# Patient Record
Sex: FEMALE | Race: OTHER | Employment: FULL TIME | URBAN - METROPOLITAN AREA
[De-identification: names, ages, dates, MRNs, and addresses within clinical notes are randomized per-mention and may not be internally consistent; named-entity substitution may affect disease eponyms.]

---

## 2017-05-30 ENCOUNTER — HOSPITAL ENCOUNTER (OUTPATIENT)
Dept: GENERAL RADIOLOGY | Age: 52
Discharge: HOME | End: 2017-05-30
Payer: COMMERCIAL

## 2017-05-30 DIAGNOSIS — M54.16 LUMBAR RADICULOPATHY: ICD-10-CM

## 2017-05-30 PROCEDURE — 72110 X-RAY EXAM L-2 SPINE 4/>VWS: CPT

## 2017-07-06 ENCOUNTER — OFFICE VISIT (OUTPATIENT)
Dept: ORTHOPEDIC SURGERY | Age: 52
End: 2017-07-06

## 2017-07-06 VITALS
OXYGEN SATURATION: 99 % | TEMPERATURE: 98.5 F | SYSTOLIC BLOOD PRESSURE: 133 MMHG | BODY MASS INDEX: 51.39 KG/M2 | HEIGHT: 58 IN | WEIGHT: 244.8 LBS | RESPIRATION RATE: 18 BRPM | HEART RATE: 82 BPM | DIASTOLIC BLOOD PRESSURE: 77 MMHG

## 2017-07-06 DIAGNOSIS — M47.816 SPONDYLOSIS OF LUMBAR REGION WITHOUT MYELOPATHY OR RADICULOPATHY: Primary | ICD-10-CM

## 2017-07-06 RX ORDER — GLIMEPIRIDE 4 MG/1
TABLET ORAL
Refills: 0 | COMMUNITY
Start: 2017-06-19 | End: 2018-11-01

## 2017-07-06 RX ORDER — NAPROXEN 500 MG/1
TABLET ORAL
Refills: 0 | COMMUNITY
Start: 2017-06-30 | End: 2018-11-01

## 2017-07-06 RX ORDER — HYDROCODONE BITARTRATE AND ACETAMINOPHEN 7.5; 3 MG/1; MG/1
TABLET ORAL
Refills: 0 | COMMUNITY
Start: 2017-06-28 | End: 2018-11-01

## 2017-07-06 RX ORDER — GABAPENTIN 300 MG/1
300 CAPSULE ORAL
Qty: 30 CAP | Refills: 1 | Status: SHIPPED | OUTPATIENT
Start: 2017-07-06 | End: 2018-11-01

## 2017-07-06 RX ORDER — CYCLOBENZAPRINE HCL 5 MG
TABLET ORAL
Refills: 0 | COMMUNITY
Start: 2017-06-28 | End: 2018-11-01

## 2017-07-06 NOTE — PROGRESS NOTES
Verbal order entered per Dr. Dereck Gray as documented on blue sheet:Gabapentin 300mg take 1 tab PO QHS. Disp 30 with 1 refill.  Referral to PT left L4/L5 disc protrusion

## 2017-07-06 NOTE — PATIENT INSTRUCTIONS
Learning About Degenerative Disc Disease  What is degenerative disc disease? Degenerative disc disease is not really a disease. It's a term used to describe the normal changes in your spinal discs as you age. Spinal discs are small, spongy discs that separate the bones (vertebrae) that make up the spine. The discs act as shock absorbers for the spine, so it can flex, bend, and twist.  Degenerative disc disease can take place in one or more places along the spine. It most often occurs in the discs in the lower back and the neck. What causes it? As we age, our spinal discs break down, or degenerate. This breakdown causes the symptoms of degenerative disc disease in some people. When the discs break down, they can lose fluid and dry out, and their outer layers can have tiny cracks or tears. This leads to less padding and less space between the bones in the spine. The body reacts to this by making bony growths on the spine called bone spurs. These spurs can press on the spinal nerve roots or spinal cord. This can cause pain and can affect how well the nerves work. What are the symptoms? Many people with degenerative disc disease have no pain. But others have severe pain or other symptoms that limit their activities. Some of the most common symptoms are:  · Pain in the back or neck. Where the pain occurs depends on which discs are affected. · Pain that gets worse when you move, such as bending over, reaching up, or twisting. · Pain that may occur in the rear end (buttocks), arm, or leg if a nerve is pinched. · Numbness or tingling in your arm or leg. How is it diagnosed? A doctor can often diagnose degenerative disc disease while doing a physical exam. If your exam shows no signs of a serious condition, imaging tests (such as an X-ray) aren't likely to help your doctor find the cause of your symptoms.   Sometimes degenerative disc disease is found when an X-ray is taken for another reason, such as an injury or other health problem. But even if the doctor finds degenerative disc disease, that doesn't always mean that you will have symptoms. How is it treated? There are several things you can do at home to manage pain from this problem. · To relieve pain, use ice or heat (whichever feels better) on the affected area. ¨ Put ice or a cold pack on the area for 10 to 20 minutes at a time. Put a thin cloth between the ice and your skin. ¨ Put a warm water bottle, a heating pad set on low, or a warm cloth on your back. Put a thin cloth between the heating pad and your skin. Do not go to sleep with a heating pad on your skin. · Ask your doctor if you can take acetaminophen (such as Tylenol) or nonsteroidal anti-inflammatory drugs, such as ibuprofen or naproxen. Your doctor can prescribe stronger medicines if needed. Be safe with medicines. Read and follow all instructions on the label. · Get some exercise every day. Exercise is one of the best ways to help your back feel better and stay better. It's best to start each exercise slowly. You may notice a little soreness, and that's okay. But if an exercise makes your pain worse, stop doing it. Here are things you can try:  ¨ Walking. It's the simplest and maybe the best activity for your back. It gets your blood moving and helps your muscles stay strong. ¨ Exercises that gently stretch and strengthen your stomach, back, and leg muscles. The stronger those muscles are, the better they're able to protect your back. If you have constant or severe pain in your back or spine, you may need other treatments, such as physical therapy. In some cases, your doctor may suggest surgery. Follow-up care is a key part of your treatment and safety. Be sure to make and go to all appointments, and call your doctor if you are having problems. It's also a good idea to know your test results and keep a list of the medicines you take. Where can you learn more?   Go to http://tammie.info/. Enter P062 in the search box to learn more about \"Learning About Degenerative Disc Disease. \"  Current as of: March 21, 2017  Content Version: 11.3  © 9512-1208 [employer]. Care instructions adapted under license by [employer] (which disclaims liability or warranty for this information). If you have questions about a medical condition or this instruction, always ask your healthcare professional. [place] [address] any warranty or liability for your use of this information.

## 2017-07-06 NOTE — PROGRESS NOTES
[address]  [address], [address]  [place] [address]  Phone: [phone]  Fax: [phone]        Gregg Lugo  : 1965  PCP: Evaristo Castillo MD      NEW PATIENT      ASSESSMENT AND PLAN     Toan Cannon was seen today for back pain. Diagnoses and all orders for this visit:    Spondylosis of lumbar region without myelopathy or radiculopathy  -     REFERRAL TO PHYSICAL THERAPY    Other orders  -     gabapentin (NEURONTIN) 300 mg capsule; Take 1 Cap by mouth nightly. 1. No indications for surgery at this time. 2. Referral to physical therapy. 3. Trial of Gabapentin. 4. Given information on lumbar DDD. Follow-up Disposition:  Return in about 6 weeks (around 2017). CHIEF COMPLAINT  Gil Guerrero is seen today in consultation at the request of Dr. Karlos Zapata for complaints of low back pain x 3 months. HISTORY OF PRESENT ILLNESS  Gil Guerrero is a 46 y.o. female. Pt denies any specific incident or injury that caused their pain. She notes that she has had back pain before that was relieved with a massage. This pain she has now has not gone away with massage. Her pain radiates into her LLE into her foot, intermittently. She also c/o paresthesia in her LLE. She reports that her shooting pain was so severe that it would bring her to her knees. Pt denies any paresthesia in her RT foot. She admits to a poor balance. She has not tried physical therapy for her pain. Her pain is mainly felt in her back. Pt notes that since starting Flexeril 5 mg QAM and 10 mg QHS, she has been sleeping better. Pt notes that she is doing better with her current medication regimen. She reports that her walking tolerance is normal but her pain will be increased by the time she gets home. Before she started medication, she was unable to walk around the classroom as this would cause her pain to become exacerbated.  She denies any noticed weakness in her BLE. No saddle paresthesia. Pt takes Naprosyn 500 mg with benefit. Denies persistent fevers, chills, weight changes, neurogenic bowel or bladder symptoms. Pt denies recent ED visits or hospitalizations. Pt is a . Lumbar spine MRI from MRI CT DIAGNOSTIC reviewed:   IMPRESSION  Posterior radial annular fissures at T12-L1, L3-4 and L4-5  Posterior disc extrusion at T8-9, this is at the limited exam and may touch the ventral cord. L4-5: Central disc protrusion. Advanced bilateral facet arthropathy. No evidence of neural compression. L5-S1: Advanced bilateral facet arthropathy. Pain Assessment  2017   Location of Pain Back   Severity of Pain 4   Quality of Pain Aching   Quality of Pain Comment numbness, tingling   Frequency of Pain Constant   Aggravating Factors Walking;Standing;Stairs; Bending   Aggravating Factors Comment sitting   Relieving Factors (No Data)   Relieving Factors Comment pain med         XR Results (most recent):    Results from Hospital Encounter encounter on 17   XR SPINE LUMB MIN 4 V   Narrative Lumbar Spine Series    CPT CODE: 49064    HISTORY: Low back pain x2 months, pain. COMPARISON: None. FINDINGS:    Five views obtained. The vertebra are normal in height and alignment. The disc  spaces are mildly narrowed at L3/4 and L4/5. Ventral spurring seen arising off  all the lumbar vertebral bodies. . The pedicles and pars are intact. There is no  evidence of fracture or dislocation. Mineralization is normal. There is marked  increase volume of stool throughout the colon. Impression IMPRESSION:    Mild degenerative disc disease of the lumbar spine. Marked increase volume of stool throughout the colon.                  PAST MEDICAL HISTORY   Past Medical History:   Diagnosis Date    Diabetes mellitus ([address]     Hypertension        Past Surgical History:   Procedure Laterality Date    HX  SECTION         MEDICATIONS Current Outpatient Prescriptions   Medication Sig Dispense Refill    HYDROcodone-acetaminophen (XODOL) 7.5-300 mg tablet take 1/2 to 1 tablet by mouth every 4 hours if needed for pain  0    naproxen (NAPROSYN) 500 mg tablet   0    glimepiride (AMARYL) 4 mg tablet take 1 tablet by mouth once daily  0    cyclobenzaprine (FLEXERIL) 5 mg tablet TAKE 1 TO 2 TABLETS BY MOUTH 3 TIMES A DAY IF NEEDED FOR SPASM  0    pravastatin (PRAVACHOL) 40 mg tablet Take 40 mg by mouth nightly.  pioglitazone (ACTOS) 15 mg tablet Take 45 mg by mouth daily.  lisinopril (PRINIVIL, ZESTRIL) 5 mg tablet Take 5 mg by mouth daily.  lansoprazole (PREVACID) 30 mg capsule Take 30 mg by mouth Daily (before breakfast).  metoprolol-XL (TOPROL-XL) 100 mg XL tablet Take 100 mg by mouth daily.  triamterene-hydrochlorothiazide (MAXZIDE) 75-50 mg per tablet Take 0.5 Tabs by mouth daily.  aspirin delayed-release 81 mg tablet Take 1 Tab by mouth daily. 30 Tab 0    nitroglycerin (NITROSTAT) 0.4 mg SL tablet 1 Tab by SubLINGual route every five (5) minutes as needed for Chest Pain. 10 Tab 0       ALLERGIES  No Known Allergies       SOCIAL HISTORY    Social History     Social History    Marital status:      Spouse name: N/A    Number of children: N/A    Years of education: N/A     Occupational History    Not on file. Social History Main Topics    Smoking status: Never Smoker    Smokeless tobacco: Never Used    Alcohol use No    Drug use: No    Sexual activity: Not on file     Other Topics Concern    Not on file     Social History Narrative       FAMILY HISTORY  Family History   Problem Relation Age of Onset    Hypertension Mother     Diabetes Father     Hypertension Father     Diabetes Paternal Grandmother     Heart Disease Paternal Grandfather          REVIEW OF SYSTEMS  Review of Systems   Constitutional: Negative for chills, fever and weight loss.    Respiratory: Negative for shortness of breath. Cardiovascular: Negative for chest pain. Gastrointestinal: Negative for constipation. Negative for fecal incontinence   Genitourinary: Negative for dysuria. Negative for urinary incontinence   Musculoskeletal:        Per HPI   Skin: Negative for rash. Neurological: Positive for tingling. Negative for dizziness, tremors, focal weakness and headaches. Endo/Heme/Allergies: Does not bruise/bleed easily. Psychiatric/Behavioral: The patient does not have insomnia. PHYSICAL EXAMINATION  Visit Vitals    /77    Pulse 82    Temp 98.5 °F (36.9 °C) (Oral)    Resp 18    Ht 4' 10\" (1.473 m)    Wt 244 lb 12.8 oz (111 kg)    SpO2 99%    BMI 51.16 kg/m2          Accompanied by self. Constitutional:  Well developed, well nourished, in no acute distress. Psychiatric: Affect and mood are appropriate. Integumentary: No rashes or abrasions noted on exposed areas. Cardiovascular/Peripheral Vascular: Intact l pulses. No peripheral edema is noted. Lymphatic:  No evidence of lymphedema. No cervical lymphadenopathy. SPINE/MUSCULOSKELETAL EXAM    Lumbar spine:  No rash, ecchymosis, or gross obliquity. No fasciculations. No focal atrophy is noted. No pain with hip ROM. Tenderness to palpation LT at L5-S1. No tenderness to palpation at the sciatic notch. SI joints non-tender. Trochanters non tender. Sensation grossly intact to light touch. MOTOR:       Hip Flex  Quads Hamstrings Ankle DF EHL Ankle PF   Right +4/5 +4/5 +4/5 +4/5 +4/5 +4/5   Left +4/5 +4/5 +4/5 +4/5 4/5 +4/5     Straight Leg raise negative. Mild difficulty with tandem gait. Heel walk intact. Toe rise intact    Ambulation without assistive device. FWB. Written by Adriane Calvillo, as dictated by Jessica Marte MD.    I, Dr. Jessica Marte MD, confirm that all documentation is accurate. Ms. Viviane Holland may have a reminder for a \"due or due soon\" health maintenance.  I have asked that she contact her primary care provider for follow-up on this health maintenance.

## 2017-07-11 ENCOUNTER — HOSPITAL ENCOUNTER (OUTPATIENT)
Dept: PHYSICAL THERAPY | Age: 52
Discharge: HOME | End: 2017-07-11
Payer: COMMERCIAL

## 2017-07-11 PROCEDURE — 97110 THERAPEUTIC EXERCISES: CPT

## 2017-07-11 PROCEDURE — 97162 PT EVAL MOD COMPLEX 30 MIN: CPT

## 2017-07-11 PROCEDURE — 97140 MANUAL THERAPY 1/> REGIONS: CPT

## 2017-07-11 NOTE — PROGRESS NOTES
[facility]  [address] [facility]  [place], [address]  [phone] fax    Plan of Care/ Statement of Necessity for Physical Therapy Services    Patient name: Ashwin Torres Start of Care: 2017   Referral source: Marisela Palm MD : 1965    Medical Diagnosis: Spondylosis without myelopathy or radiculopathy, lumbar region [M47.816]   Onset Date: May 2017    Treatment Diagnosis: L L/S pain with LE radiculopathy   Prior Hospitalization: see medical history Provider#: [id number]   Medications: Verified on Patient summary List    Comorbidities: morbid obesity; DM; HTN   Prior Level of Function: Able to tolerate prolonged sitting/standing; works as an      The [address] [place] [address] and following information is based on the information from the initial evaluation. Assessment/ key information: 46y.o. year old female presents with CC of L sided L/S with L LE radiculopathy that began in May 2017. Deficits include:  Decreased PA mobility at L4L5; increased mm in superior L glute and piriformis; decreased ant hip mobility and hip flexor flexibility ; poor glute and TA recruitment and strength/stabiltiy. Patient will benefit from physical therapy to address deficits, and ultimately to return patient to prior level of function. Evaluation Complexity History MEDIUM  Complexity : 1-2 comorbidities / personal factors will impact the outcome/ POC ; Examination MEDIUM Complexity : 3 Standardized tests and measures addressing body structure, function, activity limitation and / or participation in recreation  ;Presentation MEDIUM Complexity : Evolving with changing characteristics  ; Clinical Decision Making MEDIUM Complexity : FOTO score of 26-74  Overall Complexity Rating: MEDIUM  Problem List: pain affecting function, decrease ROM, decrease strength, decrease ADL/ functional abilitiies, decrease activity tolerance and decrease flexibility/ joint mobility   Treatment Plan may include any combination of the following: Therapeutic exercise, Neuromuscular re-education, Physical agent/modality, Manual therapy and Patient education  Patient / Family readiness to learn indicated by: asking questions, trying to perform skills and interest  Persons(s) to be included in education: patient (P)  Barriers to Learning/Limitations: None  Patient Goal (s): to decrease pain and stop taking medicine  Patient Self Reported Health Status: good  Rehabilitation Potential: good    Short Term Goals: To be accomplished in 1-2 weeks:   1. I and compliant with HEP for self management of symptoms. Long Term Goals: To be accomplished in 4 weeks:   1. Improve FOTO to 70 to indicate improved function with daily activities. 2. Increase B glute strength to grossly 4+5 to improve stability for prolonged standing. 3. Decrease L glute mm restrictions by >50% to decrease radicular symptoms for community ambulation. Frequency / Duration: Patient to be seen 2 times per week for 4 weeks. Patient/ Caregiver education and instruction: Diagnosis, prognosis, exercises   [x]  Plan of care has been reviewed with VILMA Encinas, PT 7/11/2017 10:02 AM    ________________________________________________________________________    I certify that the above Therapy Services are being furnished while the patient is under my care. I agree with the treatment plan and certify that this therapy is necessary.     [de-identified] Signature:____________________  Date:____________Time: _________    Please sign and return to [facility]  [address] [facility] [address]   [place], [address]  [phone] fax

## 2017-07-11 NOTE — PROGRESS NOTES
PT DAILY TREATMENT NOTE     Patient Name: Nora Reed  Date:2017  : 1965  [x]  Patient  Verified  Payor: Hadley Epley / Plan: [facility] / Product Type: HMO /    In time:840  Out time:923  Total Treatment Time (min): 43  Visit #: 1 of 8    Treatment Area: Spondylosis without myelopathy or radiculopathy, lumbar region [M47.816]    SUBJECTIVE  Pain Level (0-10 scale): 2  Any medication changes, allergies to medications, adverse drug reactions, diagnosis change, or new procedure performed?: [x] No    [] Yes (see summary sheet for update)  Subjective functional status/changes:   [] No changes reported  See eval    OBJECTIVE      12 min [x]Eval                  []Re-Eval       23 min Therapeutic Exercise:  [] See flow sheet :   Rationale: increase ROM and increase strength to improve the patients ability to perform daily activities    8 min Manual Therapy:  Per flow sheet   Rationale: decrease pain, increase ROM, increase tissue extensibility and decrease trigger points to decrease radicular symptoms and improve end-range ext for standing          With   [] TE   [] TA   [] neuro   [] other: Patient Education: [x] Review HEP    [] Progressed/Changed HEP based on:   [] positioning   [] body mechanics   [] transfers   [] heat/ice application    [] other:      Other Objective/Functional Measures:      Pain Level (0-10 scale) post treatment: 2    ASSESSMENT/Changes in Function: see POC    Patient will continue to benefit from skilled PT services to modify and progress therapeutic interventions, address functional mobility deficits, address ROM deficits, address strength deficits, analyze and address soft tissue restrictions, analyze and cue movement patterns and assess and modify postural abnormalities to attain remaining goals. [x]  See Plan of Care  []  See progress note/recertification  []  See Discharge Summary         Progress towards goals / Updated goals:  Short Term Goals:  To be accomplished in 1-2 weeks:                         1. I and compliant with HEP for self management of symptoms. Long Term Goals: To be accomplished in 4 weeks:                         1. Improve FOTO to 70 to indicate improved function with daily activities. 2. Increase B glute strength to grossly 4+5 to improve stability for prolonged standing. 3. Decrease L glute mm restrictions by >50% to decrease radicular symptoms for community ambulation.      PLAN  []  Upgrade activities as tolerated     [x]  Continue plan of care  []  Update interventions per flow sheet       []  Discharge due to:_  []  Other:_      Adarsh Roper, PT 7/11/2017  10:31 AM    Future Appointments  Date Time Provider Erin Rodrigues   7/14/2017 9:00 AM Simone Fairchild, PT [facility]   7/18/2017 7:00 AM Jairo Barragan, PT [facility]   7/21/2017 10:30 AM Lizbeth Mckeon, PT [facility]   7/25/2017 9:30 AM Jairo Barragan, PT [facility]   7/27/2017 10:00 AM Adarsh Roper, PT [facility]   7/31/2017 8:30 AM Jairo Barragan, PT [facility]   8/4/2017 9:00 AM Adarsh Roper, PT [facility]

## 2017-07-14 ENCOUNTER — HOSPITAL ENCOUNTER (OUTPATIENT)
Dept: PHYSICAL THERAPY | Age: 52
Discharge: HOME | End: 2017-07-14
Payer: COMMERCIAL

## 2017-07-14 PROCEDURE — 97110 THERAPEUTIC EXERCISES: CPT | Performed by: PHYSICAL THERAPIST

## 2017-07-14 PROCEDURE — 97140 MANUAL THERAPY 1/> REGIONS: CPT | Performed by: PHYSICAL THERAPIST

## 2017-07-14 NOTE — PROGRESS NOTES
PT DAILY TREATMENT NOTE     Patient Name: Sg Restrepo  Date:2017  : 1965  [x]  Patient  Verified  Payor: Johanna Almeida / Plan: Kenia Potter / Product Type: HMO /    In time:9:00  Out time:9:34  Total Treatment Time (min): 34  Visit #: 2 of 8    Treatment Area: Spondylosis without myelopathy or radiculopathy, lumbar region [M47.816]    SUBJECTIVE  Pain Level (0-10 scale): 3  Any medication changes, allergies to medications, adverse drug reactions, diagnosis change, or new procedure performed?: [x] No    [] Yes (see summary sheet for update)  Subjective functional status/changes:   [] No changes reported  Feels good. No new issues. Just has pain in the back. OBJECTIVE    24 min Therapeutic Exercise:  [x] See flow sheet :   Rationale: increase ROM, increase strength and decrease pain to improve the patients ability to complete ADLs    10 min Manual Therapy:   PA mobs, trigger point release   Rationale: decrease pain, increase ROM, increase tissue extensibility and decrease trigger points to aid completion of ADLs            With   [x] TE   [] TA   [] neuro   [] other: Patient Education: [x] Review HEP    [] Progressed/Changed HEP based on:   [] positioning   [] body mechanics   [] transfers   [] heat/ice application    [] other:      Other Objective/Functional Measures: NA     Pain Level (0-10 scale) post treatment: 3    ASSESSMENT/Changes in Function: Patient responds well to treatment session. Has minimal difficulty with exercises as prescribed. Remains TTP in L QL/glute med resion. Progress as tolerated. No adverse effects were noted from today's treatment session      Patient will continue to benefit from skilled PT services to modify and progress therapeutic interventions, address functional mobility deficits, address ROM deficits and address strength deficits to attain remaining goals.      [x]  See Plan of Care  []  See progress note/recertification  []  See Discharge Summary Progress towards goals / Updated goals:  Short Term Goals: To be accomplished in 1-2 weeks:                         1. I and compliant with HEP for self management of symptoms. Met 7/14/2017  Long Term Goals: To be accomplished in 4 weeks:                         1. Improve FOTO to 70 to indicate improved function with daily activities. 2. Increase B glute strength to grossly 4+5 to improve stability for prolonged standing. 3. Decrease L glute mm restrictions by >50% to decrease radicular symptoms for community ambulation.      PLAN  [x]  Upgrade activities as tolerated     [x]  Continue plan of care  []  Update interventions per flow sheet       []  Discharge due to:_  []  Other:_      Nadia Andrews, WARREN 7/14/2017  9:40 AM    Future Appointments  Date Time Provider Erin Rodrigues   7/18/2017 7:00 AM Junior Kee, PT [facility]   7/21/2017 10:30 AM Nati Yousif, PT [facility]   7/25/2017 9:30 AM Junior Kee, PT [facility]   7/27/2017 10:00 AM Boo Nixon, PT [facility]   7/31/2017 8:30 AM Junior Kee, PT [facility]   8/4/2017 9:00 AM Boo Ring, PT [facility]

## 2017-07-18 ENCOUNTER — HOSPITAL ENCOUNTER (OUTPATIENT)
Dept: PHYSICAL THERAPY | Age: 52
Discharge: HOME | End: 2017-07-18
Payer: COMMERCIAL

## 2017-07-18 PROCEDURE — 97112 NEUROMUSCULAR REEDUCATION: CPT

## 2017-07-18 PROCEDURE — 97110 THERAPEUTIC EXERCISES: CPT

## 2017-07-18 NOTE — PROGRESS NOTES
PT DAILY TREATMENT NOTE     Patient Name: Darby Herring  Date:2017  : 1965  [x]  Patient  Verified  Payor: Elois Schlatter / Plan: Rosa Mask / Product Type: HMO /    In time:7:00  Out time:7:43  Total Treatment Time (min): 43  Visit #: 3 of 8    Treatment Area: Spondylosis without myelopathy or radiculopathy, lumbar region [M47.816]    SUBJECTIVE  Pain Level (0-10 scale): 10  Any medication changes, allergies to medications, adverse drug reactions, diagnosis change, or new procedure performed?: [x] No    [] Yes (see summary sheet for update)  Subjective functional status/changes:   [] No changes reported  The patient states that she has most of her pain in the morning. This morning she rates as a not too bad, and notes that she has been compliant with home exercises. She does note that following last appointment she was sore in her left hip for about 2 days following manual.      OBJECTIVE  31 min Therapeutic Exercise:  [x] See flow sheet :   Rationale: increase ROM and increase strength to improve the patients ability to improve ADL ease. 12 min Neuromuscular Re-education:  [x]  See flow sheet :   Rationale: increase ROM and increase strength  to improve the patients ability to improve ADL ease. With   [] TE   [] TA   [] neuro   [] other: Patient Education: [x] Review HEP    [] Progressed/Changed HEP based on:   [] positioning   [] body mechanics   [] transfers   [] heat/ice application    [] other:      Other Objective/Functional Measures:   Required cuing to reduce lateral trunk lean during standing marches with TB isometric shoulder extension as well as hip x 3. Pain Level (0-10 scale) post treatment:  2/10    ASSESSMENT/Changes in Function: Added closed chain exercises and progressed table exercises void of increase of pain. Opted to hold manual this visit and progress exercise and ascertain response without added variable of both entities.      Patient will continue to benefit from skilled PT services to modify and progress therapeutic interventions, address functional mobility deficits, address ROM deficits, address strength deficits, analyze and address soft tissue restrictions, analyze and cue movement patterns, analyze and modify body mechanics/ergonomics, assess and modify postural abnormalities and instruct in home and community integration to attain remaining goals. []  See Plan of Care  []  See progress note/recertification  []  See Discharge Summary         Progress towards goals / Updated goals:  Short Term Goals: To be accomplished in 1-2 weeks:                         1. I and compliant with HEP for self management of symptoms. Met 7/14/2017  Long Term Goals: To be accomplished in 4 weeks:                         1. Improve FOTO to 70 to indicate improved function with daily activities. 2. Increase B glute strength to grossly 4+5 to improve stability for prolonged standing. 3. Decrease L glute mm restrictions by >50% to decrease radicular symptoms for community ambulation.      PLAN  []  Upgrade activities as tolerated     [x]  Continue plan of care  []  Update interventions per flow sheet       []  Discharge due to:_  []  Other:_      Desmond Funez, PT 7/18/2017  7:06 AM    Future Appointments  Date Time Provider Erin Rodrigues   7[facility]21[facility]2017 10:30 AM [facility]   7/25/2017 9:30 AM Desmond Funez, PT [facility]   7/27/2017 10:00 AM [facility]   7/31/2017 8:30 AM Desmond Funez PT [facility]   8/4/2017 9:00 AM Chirag Flood, PT [facility]

## 2017-07-20 DIAGNOSIS — M54.16 LUMBAR RADICULOPATHY: ICD-10-CM

## 2017-07-21 ENCOUNTER — HOSPITAL ENCOUNTER (OUTPATIENT)
Dept: PHYSICAL THERAPY | Age: 52
Discharge: HOME | End: 2017-07-21
Payer: COMMERCIAL

## 2017-07-21 PROCEDURE — 97140 MANUAL THERAPY 1/> REGIONS: CPT

## 2017-07-21 PROCEDURE — 97110 THERAPEUTIC EXERCISES: CPT

## 2017-07-21 PROCEDURE — 97112 NEUROMUSCULAR REEDUCATION: CPT

## 2017-07-21 NOTE — PROGRESS NOTES
PT DAILY TREATMENT NOTE - [employer] [facility] 3-16    Patient Name: Hernandez Banks  Date:2017  : 1965  [x]  Patient  Verified  Payor: Jaspreet Ruiz / Plan: Ayden Connor / Product Type: HMO /    In time:1033  Out time:1102  Total Treatment Time (min): 29  Total Timed Codes (min): 29  1:1 Treatment Time ( [address] only): 25   Visit #: 4 of 8    Treatment Area: Spondylosis without myelopathy or radiculopathy, lumbar region [M47.816]    SUBJECTIVE  Pain Level (0-10 scale): 8  Any medication changes, allergies to medications, adverse drug reactions, diagnosis change, or new procedure performed?: [x] No    [] Yes (see summary sheet for update)  Subjective functional status/changes:   [] No changes reported  Pt reports increased pain today, waking up with more pain and worse when sitting in the car.     OBJECTIVE     min []Eval                  []Re-Eval     10 min Therapeutic Exercise:  [x] See flow sheet :   Rationale: increase ROM and increase strength to improve the patients ability to perform ADLs    11 min Neuromuscular Re-education:  [x]  See flow sheet :   Rationale: increase strength, improve coordination and increase proprioception  to improve the patients ability to improve stability     8 min Manual Therapy:  R ant/L post SIJ MET with shotgun, L sacral torsion MET, end range million dollar roll position stretch, MFR along L glutes with hip IR/ER   Rationale: decrease pain, increase ROM and increase tissue extensibility to perform ADLs            With   [] TE   [] TA   [] neuro   [] other: Patient Education: [x] Review HEP    [] Progressed/Changed HEP based on:   [] positioning   [] body mechanics   [] transfers   [] heat/ice application    [] other:      Other Objective/Functional Measures:   Radicular symptoms - worse pre-manual; eased/abolished post manual     Pain Level (0-10 scale) post treatment: 3    ASSESSMENT/Changes in Function:   Pt demonstrated a SIJ mal-alignment issue today, probably leading to increased pain and TTP reported along L SIJ region. Decreased tenderness and pain post manual, and reinforced stability with flexibility and standing hip strengthening. Pt asked about her normal massage, and she was encouraged to continue with this. Reviewed MHP/CP use for pain control. Patient will continue to benefit from skilled PT services to modify and progress therapeutic interventions, address functional mobility deficits, address ROM deficits, address strength deficits, analyze and address soft tissue restrictions, analyze and cue movement patterns, analyze and modify body mechanics/ergonomics, assess and modify postural abnormalities and instruct in home and community integration to attain remaining goals. []  See Plan of Care  []  See progress note/recertification  []  See Discharge Summary         Progress towards goals / Updated goals:  Short Term Goals: To be accomplished in 1-2 weeks:                         1. I and compliant with HEP for self management of symptoms. Met 7/14/2017  Long Term Goals: To be accomplished in 4 weeks:                         1. Improve FOTO to 70 to indicate improved function with daily activities. 2. Increase B glute strength to grossly 4+5 to improve stability for prolonged standing. 3. Decrease L glute mm restrictions by >50% to decrease radicular symptoms for community ambulation.  not met L>R 7/21/2017     PLAN  [x]  Upgrade activities as tolerated     [x]  Continue plan of care  [x]  Update interventions per flow sheet       []  Discharge due to:_  []  Other:_      Mecca Downs, PT 7/21/2017  10:16 AM    Future Appointments  Date Time Provider Erin Rodrigues   7/21/2017 10:30 AM [facility]   7/25/2017 9:30 AM [facility]   7/27/2017 10:00 AM [facility]   7/31/2017 8:30 AM Zoey Malone PT [facility]   8/4/2017 9:00 AM Dondra Hang, PT [facility]

## 2017-07-25 ENCOUNTER — HOSPITAL ENCOUNTER (OUTPATIENT)
Dept: PHYSICAL THERAPY | Age: 52
Discharge: HOME | End: 2017-07-25
Payer: COMMERCIAL

## 2017-07-25 PROCEDURE — 97110 THERAPEUTIC EXERCISES: CPT

## 2017-07-25 PROCEDURE — 97112 NEUROMUSCULAR REEDUCATION: CPT

## 2017-07-25 PROCEDURE — 97140 MANUAL THERAPY 1/> REGIONS: CPT

## 2017-07-25 NOTE — PROGRESS NOTES
PT DAILY TREATMENT NOTE     Patient Name: Joseph Romero  Date:2017  : 1965  [x]  Patient  Verified  Payor: Annette Helms / Plan: Levon Back / Product Type: HMO /    In time:9:30  Out time:10:20  Total Treatment Time (min): 50  Visit #: 5 of 8    Treatment Area: Spondylosis without myelopathy or radiculopathy, lumbar region [M47.816]    SUBJECTIVE  Pain Level (0-10 scale): 210  Any medication changes, allergies to medications, adverse drug reactions, diagnosis change, or new procedure performed?: [x] No    [] Yes (see summary sheet for update)  Subjective functional status/changes:   [] No changes reported  The patient states she had a pretty good weekend. OBJECTIVE  32 min Therapeutic Exercise:  [x] See flow sheet :   Rationale: increase ROM and increase strength to improve the patients ability to improve ADL ease. 10 min Neuromuscular Re-education:  [x]  See flow sheet :   Rationale: increase ROM and increase strength  to improve the patients ability to improve ADL ease. 8 min Manual Therapy:  L sacral torsion, with nutation noted. MET performed, near WNL following    Rationale: decrease pain, increase ROM and increase tissue extensibility to improve ADL ease         With   [] TE   [] TA   [] neuro   [] other: Patient Education: [x] Review HEP    [] Progressed/Changed HEP based on:   [] positioning   [] body mechanics   [] transfers   [] heat/ice application    [] other:      Other Objective/Functional Measures: FOTO: 56   Hip ABD: 4+/5 MMT bilaterally  Hip extension: 4+/5 MMT bilaterally    Pain Level (0-10 scale) post treatment: 2/10    ASSESSMENT/Changes in Function: The patient has progressed with FOTO score and hip strength. She has progressed fairly well PT up to this point.     Patient will continue to benefit from skilled PT services to modify and progress therapeutic interventions, address functional mobility deficits, address ROM deficits, address strength deficits, analyze and address soft tissue restrictions, analyze and cue movement patterns, analyze and modify body mechanics/ergonomics, assess and modify postural abnormalities and instruct in home and community integration to attain remaining goals. []  See Plan of Care  []  See progress note/recertification  []  See Discharge Summary         Progress towards goals / Updated goals:  Short Term Goals: To be accomplished in 1-2 weeks:                         1. I and compliant with HEP for self management of symptoms. Met 7/14/2017  Long Term Goals: To be accomplished in 4 weeks:                         1. Improve FOTO to 70 to indicate improved function with daily activities. Progressed to 56 7/25/2017  2. Increase B glute strength to grossly 4+5 to improve stability for prolonged standing. Met - 4+/5 MMT   3. Decrease L glute mm restrictions by >50% to decrease radicular symptoms for community ambulation.  not met L>R 7/21/2017     PLAN  []  Upgrade activities as tolerated     [x]  Continue plan of care  []  Update interventions per flow sheet       []  Discharge due to:_  []  Other:_      Jairo Barragan, PT 7/25/2017  9:58 AM    Future Appointments  Date Time Provider Erin Rodrigues   8/4/2017 9:00 AM Adarsh Roper, PT [facility]

## 2017-07-27 ENCOUNTER — APPOINTMENT (OUTPATIENT)
Dept: PHYSICAL THERAPY | Age: 52
End: 2017-07-27
Payer: COMMERCIAL

## 2017-07-31 ENCOUNTER — APPOINTMENT (OUTPATIENT)
Dept: PHYSICAL THERAPY | Age: 52
End: 2017-07-31
Payer: COMMERCIAL

## 2017-08-04 ENCOUNTER — HOSPITAL ENCOUNTER (OUTPATIENT)
Dept: PHYSICAL THERAPY | Age: 52
Discharge: HOME | End: 2017-08-04
Payer: COMMERCIAL

## 2017-08-04 PROCEDURE — 97112 NEUROMUSCULAR REEDUCATION: CPT

## 2017-08-04 PROCEDURE — 97140 MANUAL THERAPY 1/> REGIONS: CPT

## 2017-08-04 PROCEDURE — 97110 THERAPEUTIC EXERCISES: CPT

## 2017-08-04 NOTE — PROGRESS NOTES
PT DAILY TREATMENT NOTE     Patient Name: Jamari Shin  Date:2017  : 1965  [x]  Patient  Verified  Payor: Ace Lewis / Plan: Jd Marvin / Product Type: HMO /    In [id number]  Out [id number]  Total Treatment Time (min):46   Visit #: 6 of 8    Treatment Area: Spondylosis without myelopathy or radiculopathy, lumbar region [M47.816]    SUBJECTIVE  Pain Level (0-10 scale): 1/10  Any medication changes, allergies to medications, adverse drug reactions, diagnosis change, or new procedure performed?: [x] No    [] Yes (see summary sheet for update)  Subjective functional status/changes:   [] No changes reported  Pt reports she is doing much better and feels ready for discharge. OBJECTIVE  30 min Therapeutic Exercise:  [x] See flow sheet :   Rationale: increase ROM and increase strength to improve the patients ability to improve ADL ease. 8 min Neuromuscular Re-education:  [x]  See flow sheet :   Rationale: increase ROM and increase strength  to improve the patients ability to improve ADL ease. 8 min Manual Therapy:  L sacral torsion, with nutation noted. MET performed, near WNL following    Rationale: decrease pain, increase ROM and increase tissue extensibility to improve ADL ease         With   [x] TE   [] TA   [] neuro   [] other: Patient Education: [x] Review HEP    [x] Progressed/Changed HEP based on: updated HEP   [] positioning   [] body mechanics   [] transfers   [] heat/ice application    [] other:      Other Objective/Functional Measures: Issued updated HEP    Pain Level (0-10 scale) post treatment: 1/10    ASSESSMENT/Changes in Function: pt has progressed well with PT. She demonstrates intermittent LE symptoms.      []  See Plan of Care  []  See progress note/recertification  [x]  See Discharge Summary         Progress towards goals / Updated goals:  Short Term Goals: To be accomplished in 1-2 weeks:                         1. I and compliant with HEP for self management of symptoms. Met 7/14/2017  Long Term Goals: To be accomplished in 4 weeks:                         1. Improve FOTO to 70 to indicate improved function with daily activities. Progressed to 56 7/25/2017  2. Increase B glute strength to grossly 4+5 to improve stability for prolonged standing. Met - 4+/5 MMT   3. Decrease L glute mm restrictions by >50% to decrease radicular symptoms for community ambulation. not met L>R 7/21/2017     PLAN  []  Upgrade activities as tolerated     [x]  Continue plan of care  []  Update interventions per flow sheet       []  Discharge due to:_  []  Other:_      Tia Record, PT 8/4/2017  9:08 AM    No future appointments.

## 2017-08-04 NOTE — PROGRESS NOTES
[facility]  [address]  [place], [address]  [phone] fax    Physical Therapy Discharge Summary  Patient name: Donald Ramachandran Start of Care: 2017   Referral source: Ramón Alvarez MD : 1965                          Medical Diagnosis: Spondylosis without myelopathy or radiculopathy, lumbar region [M47.816] Onset Date: May 2017                          Treatment Diagnosis: L L/S pain with LE radiculopathy   Prior Hospitalization: see medical history Provider#: [id number]   Medications: Verified on Patient summary List    Comorbidities: morbid obesity; DM; HTN   Prior Level of Function: Able to tolerate prolonged sitting/standing;     Visits from Start of Care: 6    Missed Visits: 0  Reporting Period : 17 to 17      Summary of Care: pt reports she is feeling better and feels ready for discharge. She is I and compliant with updated HEP. She reports intermittent LE symptoms but states the pain is much improved. Progress towards goals / Updated goals:  Short Term Goals: To be accomplished in 1-2 weeks:                         1. I and compliant with HEP for self management of symptoms. Met 2017  Long Term Goals: To be accomplished in 4 weeks:                         1. Improve FOTO to 70 to indicate improved function with daily activities. Progressed to 56 2017  2. Increase B glute strength to grossly 4+5 to improve stability for prolonged standing. Met - 4+/5 MMT   3. Decrease L glute mm restrictions by >50% to decrease radicular symptoms for community ambulation.  not met L>R 2017       ASSESSMENT/RECOMMENDATIONS:  [x]Discontinue therapy: [x]Patient has reached or is progressing toward set goals      []Patient is non-compliant or has abdicated      []Due to lack of appreciable progress towards set [address], PT 2017 9:52 AM

## 2018-02-23 ENCOUNTER — HOSPITAL ENCOUNTER (EMERGENCY)
Age: 53
Discharge: HOME | End: 2018-02-23
Attending: EMERGENCY MEDICINE
Payer: OTHER GOVERNMENT

## 2018-02-23 ENCOUNTER — APPOINTMENT (OUTPATIENT)
Dept: GENERAL RADIOLOGY | Age: 53
End: 2018-02-23
Attending: EMERGENCY MEDICINE
Payer: OTHER GOVERNMENT

## 2018-02-23 VITALS
HEART RATE: 77 BPM | TEMPERATURE: 97.8 F | WEIGHT: 180 LBS | DIASTOLIC BLOOD PRESSURE: 84 MMHG | HEIGHT: 58 IN | RESPIRATION RATE: 16 BRPM | BODY MASS INDEX: 37.79 KG/M2 | SYSTOLIC BLOOD PRESSURE: 167 MMHG | OXYGEN SATURATION: 99 %

## 2018-02-23 DIAGNOSIS — M25.531 RIGHT WRIST PAIN: Primary | ICD-10-CM

## 2018-02-23 PROCEDURE — 74011250637 HC RX REV CODE- 250/637: Performed by: EMERGENCY MEDICINE

## 2018-02-23 PROCEDURE — 99283 EMERGENCY DEPT VISIT LOW MDM: CPT

## 2018-02-23 PROCEDURE — L3908 WHO COCK-UP NONMOLDE PRE OTS: HCPCS

## 2018-02-23 PROCEDURE — 73110 X-RAY EXAM OF WRIST: CPT

## 2018-02-23 RX ORDER — OXYCODONE AND ACETAMINOPHEN 5; 325 MG/1; MG/1
1-2 TABLET ORAL
Qty: 16 TAB | Refills: 0 | Status: SHIPPED | OUTPATIENT
Start: 2018-02-23 | End: 2018-11-01

## 2018-02-23 RX ORDER — ONDANSETRON 4 MG/1
4 TABLET, ORALLY DISINTEGRATING ORAL
Qty: 14 TAB | Refills: 0 | Status: SHIPPED | OUTPATIENT
Start: 2018-02-23 | End: 2018-11-01

## 2018-02-23 RX ORDER — IBUPROFEN 600 MG/1
600 TABLET ORAL
Qty: 18 TAB | Refills: 0 | Status: SHIPPED | OUTPATIENT
Start: 2018-02-23 | End: 2018-11-01

## 2018-02-23 RX ORDER — IBUPROFEN 400 MG/1
800 TABLET ORAL
Status: DISCONTINUED | OUTPATIENT
Start: 2018-02-23 | End: 2018-02-23

## 2018-02-23 RX ORDER — OXYCODONE AND ACETAMINOPHEN 5; 325 MG/1; MG/1
1 TABLET ORAL
Status: COMPLETED | OUTPATIENT
Start: 2018-02-23 | End: 2018-02-23

## 2018-02-23 RX ADMIN — OXYCODONE HYDROCHLORIDE AND ACETAMINOPHEN 1 TABLET: 5; 325 TABLET ORAL at 02:43

## 2018-02-23 NOTE — ED PROVIDER NOTES
HPI Comments: Pt c/o rt wrist pain. H/o carpal tunnel syndrome in the wrist.  Feels sim but worse this time. Woke up w it yesterday. No injury. Says noted a small cyst over area of pain x 4 days. No weakness or numbness. No other area of arm pain.  + tingling to rt first 3 digits. No redness or warmth. Applying heat, helping some. No sob or chest pain. No fever or chills. Patient is a 46 y.o. female presenting with wrist pain. Wrist Pain    Pertinent negatives include no back pain. Past Medical History:   Diagnosis Date    Diabetes mellitus ([facility] [address]     Hypertension        Past Surgical History:   Procedure Laterality Date    HX  SECTION           Family History:   Problem Relation Age of Onset    Hypertension Mother     Diabetes Father     Hypertension Father     Diabetes Paternal Grandmother     Heart Disease Paternal Grandfather        Social History     Social History    Marital status:      Spouse name: N/A    Number of children: N/A    Years of education: N/A     Occupational History    Not on file. Social History Main Topics    Smoking status: Never Smoker    Smokeless tobacco: Never Used    Alcohol use No    Drug use: No    Sexual activity: Not on file     Other Topics Concern    Not on file     Social History Narrative         ALLERGIES: Review of patient's allergies indicates no known allergies. Review of Systems   Constitutional: Negative for fever. Gastrointestinal: Negative for vomiting. Musculoskeletal: Positive for arthralgias (  ). Negative for back pain. Skin: Negative for rash. All other systems reviewed and are negative. Vitals:    18 0224   BP: 167/84   Pulse: 77   Resp: 16   Temp: 97.8 °F (36.6 °C)   SpO2: 99%   Weight: 81.6 kg (180 lb)   Height: 4' 10\" (1.473 m)            Physical Exam   Constitutional: She is oriented to person, place, and time. She appears well-developed. HENT:   Head: Normocephalic.    Mouth/Throat: Oropharynx is clear and moist.   Eyes: Pupils are equal, round, and reactive to light. Neck: Normal range of motion. Pulmonary/Chest: Effort normal. She has no wheezes. She has no rales. Musculoskeletal: Normal range of motion. She exhibits tenderness (+ rt wrist volar medial ttp w 0.5x0.5 cm cyst, mobile, ttp.  no induration/fluctaunce/erythema.  nvi. pain w rt wrist flexion. no hand ttp, no snuff box ttp). She exhibits no edema. Neurological: She is alert and oriented to person, place, and time. Skin: Skin is warm and dry. Nursing note and vitals reviewed. [facility]      ED Course       Procedures    Vitals:  Patient Vitals for the past 12 hrs:   Temp Pulse Resp BP SpO2   02/23/18 0224 97.8 °F (36.6 °C) 77 16 167/84 99 %         Medications ordered:   Medications   oxyCODONE-acetaminophen (PERCOCET) 5-325 mg per tablet 1 Tab (1 Tab Oral Given 2/23/18 0243)         Lab findings:  No results found for this or any previous visit (from the past 12 hour(s)). X-Ray, CT or other radiology findings or impressions:  XR WRIST RT AP/LAT/OBL MIN 3V    (Results Pending)       Progress notes, Consult notes or additional Procedure notes:   lucency at scaphoid, but pt w no snuff box ttp, no trauma. Not c/w scaphoid fx. Clinically c/w ganglion cyst and exac of recurrent carpel tunnel syndrome. No emc. Af, nl vitals. Ortho referral given. velcro wrist splint given. No emc. Stable for dc and close f/u. Det ret inst given. Disposition:  Diagnosis:   1.  Right wrist pain        Disposition: home    Follow-up Information     Follow up With Details Comments Contact Info    Abdiel Cavazos MD Schedule an appointment as soon as possible for a visit in 3 days  [address] [facility]  [phone]             Discharge Medication List as of 2/23/2018  3:09 AM      START taking these medications    Details   oxyCODONE-acetaminophen (PERCOCET) 5-325 mg per tablet Take 1-2 Tabs by mouth every six (6) hours as needed for Pain. Max Daily Amount: 8 Tabs., Print, Disp-16 Tab, R-0      ibuprofen (MOTRIN) 600 mg tablet Take 1 Tab by mouth every six (6) hours as needed for Pain., Print, Disp-18 Tab, R-0      ondansetron (ZOFRAN ODT) 4 mg disintegrating tablet Take 1 Tab by mouth every eight (8) hours as needed for Nausea. , Print, Disp-14 Tab, R-0         CONTINUE these medications which have NOT CHANGED    Details   glimepiride (AMARYL) 4 mg tablet take 1 tablet by mouth once daily, Historical Med, R-0      pravastatin (PRAVACHOL) 40 mg tablet Take 40 mg by mouth nightly., Historical Med      lisinopril (PRINIVIL, ZESTRIL) 5 mg tablet Take 5 mg by mouth daily. , Historical Med      lansoprazole (PREVACID) 30 mg capsule Take 30 mg by mouth Daily (before breakfast). , Historical Med      metoprolol-XL (TOPROL-XL) 100 mg XL tablet Take 100 mg by mouth daily. , Historical Med      triamterene-hydrochlorothiazide (MAXZIDE) 75-50 mg per tablet Take 0.5 Tabs by mouth daily. , Historical Med      HYDROcodone-acetaminophen (XODOL) 7.5-300 mg tablet take 1/2 to 1 tablet by mouth every 4 hours if needed for pain, Historical Med, R-0      naproxen (NAPROSYN) 500 mg tablet Historical Med, R-0      cyclobenzaprine (FLEXERIL) 5 mg tablet TAKE 1 TO 2 TABLETS BY MOUTH 3 TIMES A DAY IF NEEDED FOR SPASM, Historical Med, R-0      gabapentin (NEURONTIN) 300 mg capsule Take 1 Cap by mouth nightly., Normal, Disp-30 Cap, R-1      aspirin delayed-release 81 mg tablet Take 1 Tab by mouth daily. , Print, Disp-30 Tab, R-0      nitroglycerin (NITROSTAT) 0.4 mg SL tablet 1 Tab by SubLINGual route every five (5) minutes as needed for Chest Pain., Print, Disp-10 Tab, R-0      pioglitazone (ACTOS) 15 mg tablet Take 45 mg by mouth daily. , Historical Med

## 2018-02-23 NOTE — LETTER
NOTIFICATION RETURN TO WORK / SCHOOL 
 
2/23/2018 3:09 AM 
 
Ms. Romaine Crawford [address] [facility],[address] [address] [address] [address] To Whom It May Concern: 
 
Romaine Crawford is currently under the care of [id number] [place] [address] EMERGENCY DEPT. She will return to work/school on: 2/26/18 If there are questions or concerns please have the patient contact our office.  
 
 
 
Sincerely, 
 
 
 
 
Yoel Garcia MD

## 2018-02-23 NOTE — ED NOTES
Splint intact at time of d/c, pt denies affected circulation to fingers. I have reviewed discharge instructions with the patient and spouse. The patient and spouse verbalized understanding. Ambulatory from ED. Patient armband removed and shredded.

## 2018-11-01 ENCOUNTER — HOSPITAL ENCOUNTER (EMERGENCY)
Age: 53
Discharge: HOME | End: 2018-11-01
Attending: EMERGENCY MEDICINE
Payer: COMMERCIAL

## 2018-11-01 ENCOUNTER — APPOINTMENT (OUTPATIENT)
Dept: GENERAL RADIOLOGY | Age: 53
End: 2018-11-01
Attending: EMERGENCY MEDICINE
Payer: COMMERCIAL

## 2018-11-01 VITALS
DIASTOLIC BLOOD PRESSURE: 56 MMHG | HEART RATE: 82 BPM | TEMPERATURE: 98.8 F | RESPIRATION RATE: 24 BRPM | HEIGHT: 58 IN | OXYGEN SATURATION: 99 % | BODY MASS INDEX: 44.08 KG/M2 | SYSTOLIC BLOOD PRESSURE: 112 MMHG | WEIGHT: 210 LBS

## 2018-11-01 DIAGNOSIS — N30.90 CYSTITIS: Primary | ICD-10-CM

## 2018-11-01 LAB
ALBUMIN SERPL-MCNC: 3.8 G/DL (ref 3.4–5)
ALBUMIN/GLOB SERPL: 1 {RATIO} (ref 0.8–1.7)
ALP SERPL-CCNC: 81 U/L (ref 45–117)
ALT SERPL-CCNC: 24 U/L (ref 13–56)
ANION GAP SERPL CALC-SCNC: 7 MMOL/L (ref 3–18)
APPEARANCE UR: ABNORMAL
AST SERPL-CCNC: 19 U/L (ref 15–37)
ATRIAL RATE: 83 BPM
BACTERIA URNS QL MICRO: ABNORMAL /HPF
BASOPHILS # BLD: 0 K/UL (ref 0–0.1)
BASOPHILS NFR BLD: 0 % (ref 0–2)
BILIRUB SERPL-MCNC: 0.4 MG/DL (ref 0.2–1)
BILIRUB UR QL: NEGATIVE
BUN SERPL-MCNC: 18 MG/DL (ref 7–18)
BUN/CREAT SERPL: 20 (ref 12–20)
CALCIUM SERPL-MCNC: 9.2 MG/DL (ref 8.5–10.1)
CALCULATED P AXIS, ECG09: 59 DEGREES
CALCULATED R AXIS, ECG10: 44 DEGREES
CALCULATED T AXIS, ECG11: 39 DEGREES
CHLORIDE SERPL-SCNC: 100 MMOL/L (ref 100–108)
CK MB CFR SERPL CALC: 1.2 % (ref 0–4)
CK MB SERPL-MCNC: 1.2 NG/ML (ref 5–25)
CK SERPL-CCNC: 102 U/L (ref 26–192)
CO2 SERPL-SCNC: 33 MMOL/L (ref 21–32)
COLOR UR: YELLOW
CREAT SERPL-MCNC: 0.92 MG/DL (ref 0.6–1.3)
DIAGNOSIS, 93000: NORMAL
DIFFERENTIAL METHOD BLD: ABNORMAL
EOSINOPHIL # BLD: 0.3 K/UL (ref 0–0.4)
EOSINOPHIL NFR BLD: 4 % (ref 0–5)
EPITH CASTS URNS QL MICRO: ABNORMAL /LPF (ref 0–5)
ERYTHROCYTE [DISTWIDTH] IN BLOOD BY AUTOMATED COUNT: 15.9 % (ref 11.6–14.5)
GLOBULIN SER CALC-MCNC: 3.7 G/DL (ref 2–4)
GLUCOSE SERPL-MCNC: 86 MG/DL (ref 74–99)
GLUCOSE UR STRIP.AUTO-MCNC: 100 MG/DL
HCT VFR BLD AUTO: 37.3 % (ref 35–45)
HGB BLD-MCNC: 11.9 G/DL (ref 12–16)
HGB UR QL STRIP: NEGATIVE
KETONES UR QL STRIP.AUTO: NEGATIVE MG/DL
LEUKOCYTE ESTERASE UR QL STRIP.AUTO: ABNORMAL
LYMPHOCYTES # BLD: 1.7 K/UL (ref 0.9–3.6)
LYMPHOCYTES NFR BLD: 23 % (ref 21–52)
MCH RBC QN AUTO: 22.2 PG (ref 24–34)
MCHC RBC AUTO-ENTMCNC: 31.9 G/DL (ref 31–37)
MCV RBC AUTO: 69.7 FL (ref 74–97)
MONOCYTES # BLD: 0.8 K/UL (ref 0.05–1.2)
MONOCYTES NFR BLD: 11 % (ref 3–10)
NEUTS SEG # BLD: 4.6 K/UL (ref 1.8–8)
NEUTS SEG NFR BLD: 62 % (ref 40–73)
NITRITE UR QL STRIP.AUTO: NEGATIVE
P-R INTERVAL, ECG05: 166 MS
PH UR STRIP: 5 [PH] (ref 5–8)
PLATELET # BLD AUTO: 298 K/UL (ref 135–420)
PMV BLD AUTO: 10.2 FL (ref 9.2–11.8)
POTASSIUM SERPL-SCNC: 3.7 MMOL/L (ref 3.5–5.5)
PROT SERPL-MCNC: 7.5 G/DL (ref 6.4–8.2)
PROT UR STRIP-MCNC: NEGATIVE MG/DL
Q-T INTERVAL, ECG07: 370 MS
QRS DURATION, ECG06: 86 MS
QTC CALCULATION (BEZET), ECG08: 434 MS
RBC # BLD AUTO: 5.35 M/UL (ref 4.2–5.3)
RBC #/AREA URNS HPF: ABNORMAL /HPF (ref 0–5)
SODIUM SERPL-SCNC: 140 MMOL/L (ref 136–145)
SP GR UR REFRACTOMETRY: 1.01 (ref 1–1.03)
TROPONIN I SERPL-MCNC: <0.02 NG/ML (ref 0–0.06)
UROBILINOGEN UR QL STRIP.AUTO: 0.2 EU/DL (ref 0.2–1)
VENTRICULAR RATE, ECG03: 83 BPM
WBC # BLD AUTO: 7.3 K/UL (ref 4.6–13.2)
WBC URNS QL MICRO: ABNORMAL /HPF (ref 0–4)

## 2018-11-01 PROCEDURE — 74011250637 HC RX REV CODE- 250/637: Performed by: EMERGENCY MEDICINE

## 2018-11-01 PROCEDURE — 71045 X-RAY EXAM CHEST 1 VIEW: CPT

## 2018-11-01 PROCEDURE — 80053 COMPREHEN METABOLIC PANEL: CPT | Performed by: EMERGENCY MEDICINE

## 2018-11-01 PROCEDURE — 82550 ASSAY OF CK (CPK): CPT | Performed by: EMERGENCY MEDICINE

## 2018-11-01 PROCEDURE — 99284 EMERGENCY DEPT VISIT MOD MDM: CPT

## 2018-11-01 PROCEDURE — 85025 COMPLETE CBC W/AUTO DIFF WBC: CPT | Performed by: EMERGENCY MEDICINE

## 2018-11-01 PROCEDURE — 81001 URINALYSIS AUTO W/SCOPE: CPT | Performed by: EMERGENCY MEDICINE

## 2018-11-01 PROCEDURE — 93005 ELECTROCARDIOGRAM TRACING: CPT

## 2018-11-01 PROCEDURE — 99285 EMERGENCY DEPT VISIT HI MDM: CPT

## 2018-11-01 RX ORDER — CEPHALEXIN 250 MG/1
500 CAPSULE ORAL
Status: COMPLETED | OUTPATIENT
Start: 2018-11-01 | End: 2018-11-01

## 2018-11-01 RX ORDER — METOPROLOL SUCCINATE 100 MG/1
100 TABLET, EXTENDED RELEASE ORAL DAILY
COMMUNITY

## 2018-11-01 RX ORDER — CEPHALEXIN 500 MG/1
500 CAPSULE ORAL 4 TIMES DAILY
Qty: 28 CAP | Refills: 0 | Status: SHIPPED | OUTPATIENT
Start: 2018-11-01 | End: 2018-11-08

## 2018-11-01 RX ORDER — ACETAMINOPHEN 500 MG
500 TABLET ORAL
Status: COMPLETED | OUTPATIENT
Start: 2018-11-01 | End: 2018-11-01

## 2018-11-01 RX ORDER — ASPIRIN 325 MG
325 TABLET ORAL
Status: COMPLETED | OUTPATIENT
Start: 2018-11-01 | End: 2018-11-01

## 2018-11-01 RX ADMIN — ASPIRIN 325 MG ORAL TABLET 325 MG: 325 PILL ORAL at 14:02

## 2018-11-01 RX ADMIN — CEPHALEXIN 500 MG: 250 CAPSULE ORAL at 16:01

## 2018-11-01 RX ADMIN — ACETAMINOPHEN 500 MG: 500 TABLET ORAL at 14:02

## 2018-11-01 NOTE — ED PROVIDER NOTES
EMERGENCY DEPARTMENT HISTORY AND PHYSICAL EXAM 
 
1:47 PM 
 
 
Date: 11/1/2018 Patient Name: Griselda Noss History of Presenting Illness Chief Complaint Patient presents with  Elevated Blood Pressure  Headache History Provided By: Patient Chief Complaint: Elevated Blood Pressure; Headache Duration: Hours Timing:  Acute Location: Posterior head Quality: N/A Severity: 4 out of 10 Modifying Factors: Did take her blood pressure medications today Associated Symptoms: Dizziness; Chest Tightness Additional History (Context): Griselda Noss is a 48 y.o. female with PMHx significant for HTN, DM, and migraines who presents with CC of acute elevated blood pressure today. 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 \" States she then took her blood pressure and it was 182/100. Notes she did take her blood pressure medications today. Pt c/o acute headache that started on her way to the ED. Notes it is located at her posterior head and 4/10 in severity at this time. Pt also c/o chest tightness that started after she arrived to the ED. Reports no other modifying factors for her symptoms. Denies smoking, alcohol use, drug use, and any other symptoms or complaints. PCP: Raysa Meadows MD 
 
Current Outpatient Medications Medication Sig Dispense Refill  metoprolol succinate (TOPROL-XL) 100 mg tablet Take 100 mg by mouth daily.  cephALEXin (KEFLEX) 500 mg capsule Take 1 Cap by mouth four (4) times daily for 7 days. 28 Cap 0  
 pravastatin (PRAVACHOL) 40 mg tablet Take 40 mg by mouth nightly.  pioglitazone (ACTOS) 15 mg tablet Take 45 mg by mouth daily.  lisinopril (PRINIVIL, ZESTRIL) 5 mg tablet Take 5 mg by mouth daily.  triamterene-hydrochlorothiazide (MAXZIDE) 75-50 mg per tablet Take 0.5 Tabs by mouth daily.     
 nitroglycerin (NITROSTAT) 0.4 mg SL tablet 1 Tab by SubLINGual route every five (5) minutes as needed for Chest Pain. 10 Tab 0 Past History Past Medical History: 
Past Medical History:  
Diagnosis Date  Diabetes mellitus ([address]  Hypertension  Migraine Past Surgical History: 
Past Surgical History:  
Procedure Laterality Date  HX  SECTION Family History: 
Family History Problem Relation Age of Onset  Hypertension Mother  Diabetes Father  Hypertension Father  Diabetes Paternal Grandmother  Heart Disease Paternal Grandfather Social History: 
Social History Tobacco Use  Smoking status: Never Smoker  Smokeless tobacco: Never Used Substance Use Topics  Alcohol use: No  
 Drug use: No  
 
 
Allergies: 
No Known Allergies Review of Systems Review of Systems Constitutional: Negative for chills, fatigue and fever. HENT: Negative for congestion, rhinorrhea and sore throat. Eyes: Negative for visual disturbance. Respiratory: Negative for cough, shortness of breath and wheezing. Cardiovascular: Positive for chest pain (tightness). Negative for palpitations and leg swelling.  
     + Elevated blood pressure Gastrointestinal: Negative for abdominal pain, diarrhea, nausea and vomiting. Genitourinary: Negative for difficulty urinating and dysuria. Musculoskeletal: Negative for arthralgias. Skin: Negative for rash. Neurological: Positive for dizziness and headaches. Negative for weakness. All other systems reviewed and are negative. Physical Exam  
 
Visit Vitals /56 Pulse 82 Temp 98.8 °F (37.1 °C) Resp 24 Ht 4' 10\" (1.473 m) Wt 95.3 kg (210 lb) SpO2 99% BMI 43.89 kg/m² Physical Exam  
Constitutional: She is oriented to person, place, and time. She appears well-developed and well-nourished. No distress. Morbidly obese. Lying in bed, appears comfortable. HENT:  
Head: Normocephalic and atraumatic. Mouth/Throat: Oropharynx is clear and moist and mucous membranes are normal. No oropharyngeal exudate. Eyes: Conjunctivae and EOM are normal. No scleral icterus. Neck: Normal range of motion. Neck supple. No JVD present. No thyromegaly present. Cardiovascular: Normal rate, regular rhythm, S1 normal, S2 normal, normal heart sounds and intact distal pulses. Exam reveals no gallop and no friction rub. No murmur heard. Pulmonary/Chest: Effort normal and breath sounds normal. No accessory muscle usage. No respiratory distress. She has no wheezes. She has no rhonchi. She has no rales. She exhibits no tenderness. Abdominal: Soft. Normal appearance and bowel sounds are normal. She exhibits no distension and no pulsatile midline mass. There is no tenderness. There is no rebound and no guarding. Musculoskeletal: Normal range of motion. Lymphadenopathy:  
     Head (right side): No submandibular adenopathy present. She has no cervical adenopathy. Neurological: She is alert and oriented to person, place, and time. Moving all extremities. No obvious deficits or facial asymmetry. Skin: Skin is warm, dry and intact. No rash noted. No erythema. Psychiatric: She has a normal mood and affect. Her speech is normal and behavior is normal.  
Nursing note and vitals reviewed. Diagnostic Study Results Labs - Recent Results (from the past 12 hour(s)) EKG, 12 LEAD, INITIAL Collection Time: 11/01/18  2:04 PM  
Result Value Ref Range Ventricular Rate 83 BPM  
 Atrial Rate 83 BPM  
 P-R Interval 166 ms  
 QRS Duration 86 ms  
 Q-T Interval 370 ms QTC Calculation (Bezet) 434 ms Calculated P Axis 59 degrees Calculated R Axis 44 degrees Calculated T Axis 39 degrees Diagnosis Normal sinus rhythm Normal ECG No previous ECGs available Confirmed by Herminia Crowell ([id number]) on 11/1/2018 4:43:56 PM 
  
CBC WITH AUTOMATED DIFF  Collection Time: 11/01/18  2:15 PM  
 Result Value Ref Range WBC 7.3 4.6 - 13.2 K/uL  
 RBC 5.35 (H) 4.20 - 5.30 M/uL  
 HGB 11.9 (L) 12.0 - 16.0 g/dL HCT 37.3 35.0 - 45.0 % MCV 69.7 (L) 74.0 - 97.0 FL  
 MCH 22.2 (L) 24.0 - 34.0 PG  
 MCHC 31.9 31.0 - 37.0 g/dL  
 RDW 15.9 (H) 11.6 - 14.5 % PLATELET 167 413 - 834 K/uL MPV 10.2 9.2 - 11.8 FL  
 NEUTROPHILS 62 40 - 73 % LYMPHOCYTES 23 21 - 52 % MONOCYTES 11 (H) 3 - 10 % EOSINOPHILS 4 0 - 5 % BASOPHILS 0 0 - 2 %  
 ABS. NEUTROPHILS 4.6 1.8 - 8.0 K/UL  
 ABS. LYMPHOCYTES 1.7 0.9 - 3.6 K/UL  
 ABS. MONOCYTES 0.8 0.05 - 1.2 K/UL  
 ABS. EOSINOPHILS 0.3 0.0 - 0.4 K/UL  
 ABS. BASOPHILS 0.0 0.0 - 0.1 K/UL  
 DF AUTOMATED CARDIAC PANEL,(CK, CKMB & TROPONIN) Collection Time: 11/01/18  2:15 PM  
Result Value Ref Range  26 - 192 U/L  
 CK - MB 1.2 <3.6 ng/ml CK-MB Index 1.2 0.0 - 4.0 % Troponin-I, Qt. <0.02 0.00 - 6.56 NG/ML  
METABOLIC PANEL, COMPREHENSIVE Collection Time: 11/01/18  2:15 PM  
Result Value Ref Range Sodium 140 136 - 145 mmol/L Potassium 3.7 3.5 - 5.5 mmol/L Chloride 100 100 - 108 mmol/L  
 CO2 33 (H) 21 - 32 mmol/L Anion gap 7 3.0 - 18 mmol/L Glucose 86 74 - 99 mg/dL BUN 18 7.0 - 18 MG/DL Creatinine 0.92 0.6 - 1.3 MG/DL  
 BUN/Creatinine ratio 20 12 - 20 GFR est AA >60 >60 ml/min/1.73m2 GFR est non-AA >60 >60 ml/min/1.73m2 Calcium 9.2 8.5 - 10.1 MG/DL Bilirubin, total 0.4 0.2 - 1.0 MG/DL  
 ALT (SGPT) 24 13 - 56 U/L  
 AST (SGOT) 19 15 - 37 U/L Alk. phosphatase 81 45 - 117 U/L Protein, total 7.5 6.4 - 8.2 g/dL Albumin 3.8 3.4 - 5.0 g/dL Globulin 3.7 2.0 - 4.0 g/dL A-G Ratio 1.0 0.8 - 1.7 URINALYSIS W/ RFLX MICROSCOPIC Collection Time: 11/01/18  2:53 PM  
Result Value Ref Range Color YELLOW Appearance CLOUDY Specific gravity 1.015 1.005 - 1.030    
 pH (UA) 5.0 5.0 - 8.0 Protein NEGATIVE  NEG mg/dL Glucose 100 (A) NEG mg/dL Ketone NEGATIVE  NEG mg/dL Bilirubin NEGATIVE  NEG Blood NEGATIVE  NEG Urobilinogen 0.2 0.2 - 1.0 EU/dL Nitrites NEGATIVE  NEG Leukocyte Esterase LARGE (A) NEG URINE MICROSCOPIC ONLY Collection Time: 11/01/18  2:53 PM  
Result Value Ref Range WBC 36 to 50 0 - 4 /hpf  
 RBC 4 to 10 0 - 5 /hpf Epithelial cells 4+ 0 - 5 /lpf Bacteria 2+ (A) NEG /hpf Radiologic Studies - Xr Chest [facility] Result Date: 11/1/2018 EXAMINATION: Chest single view INDICATION: Chest pain COMPARISON: None FINDINGS: Single frontal view of the chest obtained. Low lung lines and underpenetration limits evaluation. Mildly enlarged cardiac silhouette. Perhaps mild prominent perihilar interstitium. No consolidation. No evidence of pneumothorax. No acute osseous findings. IMPRESSION: 1. Mildly enlarged cardiac silhouette with perhaps mild perihilar vascular congestion versus infiltrate. No consolidation. Medical Decision Making I am the first provider for this patient. I reviewed the vital signs, available nursing notes, past medical history, past surgical history, family history and social history. Vital Signs-Reviewed the patient's vital signs. Pulse Oximetry Analysis -  99% on room air, normal  
  
EKG: Interpreted by the EP. Time Interpreted: 14:04 Rate: 83 Rhythm: Normal Sinus Rhythm Interpretation: Normal axis. Normal intervals. No ST or T-wave changes. Comparison: Compared to 10/19/2013, no significant change based on the written interpretation but visual images will not load on computer today. Records Reviewed: Nursing Notes, Old Medical Records and Previous electrocardiograms (Time of Review: 1:47 PM) Provider Notes (Medical Decision Making): ASSESSMENT / PLAN: 
     48y/o  woman, PMHX of Morbid Obesity (BMI 44), HTN, Migraines, hyerlipidemia, diabetes who presents via POV for cc of high blood pressure and nonspecific 'not feeling well'.   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 Felt mildly light headed. Checked BP which was ~180/100 so came to ED. On arrival here, BP normal 137/65 but pt reports still feeling 'not right'. On thorough ROS, says mild back of head pressure/pain that started on drive here. Since getting to room, felt light pressure in left chest.  No confusion, no vision changes, no SOB, no n/v/d, no f/c. Hasn't taken anything for pain. On exam, morbidly obese woman laying in bed, NAD. Vitals completely normal. I rechecked BP myself. HEENT, lung, CV, abd benign. Unsrue cause of her general unwell feeling. Could be atypical ACS, could be electrolyte disturbance, jan, anemia, uti. Hard to say this is hypertension w/end organ damage if bp is normal. Pt had indeterminte cardiac cath in 2013. Reports she had stress test w/her private cardiologits about 3yrs ago which was normal. No record of this here. -IV access 
-EKG 
-CXR 
-CBC, CMP, Coags 
-Card Enyzmes 
-UA 
-ASA 325mg PO 
-Tylenol 500mg PO  
-Reassess Ba Amaro MD 
EM-IM Physician ED Course: Progress Notes, Reevaluation, and Consults: 
UPDATE 3:51 PM 
-CBC nml 
-CMP nml 
-Card Enz neg 
-EKG bening 
-CXR benign 
 
-UA grossly infected with 30-50 WBC, 2+ Bact 
 
-No previous Urine Cultures on record 
 
-Will tx with Keflex 500mg PO QID x 7dys (1st dose here) -DC home 
-F/U with PCP 
-Return to ED any problems/concerns. Liban Valdes MD 
EM-IM Physician Diagnosis Clinical Impression: 1. Cystitis Disposition: Home Follow-up Information Follow up With Specialties Details Why Contact Info Pallavi Hughes MD Family Practice Call in 1 day  [address] [facility] [address] [phone] [id number] [facility] EMERGENCY DEPT Emergency Medicine  As needed, If symptoms worsen [address] [place] [facility] [id number] [phone] Medication List  
  
START taking these medications   
cephALEXin 500 mg capsule Commonly known as:  Kin Santo Take 1 Cap by mouth four (4) times daily for 7 days. CONTINUE taking these medications   
lisinopril 5 mg tablet Commonly known as:  PRINIVIL, ZESTRIL 
  
metoprolol succinate 100 mg tablet Commonly known as:  TOPROL-XL 
  
nitroglycerin 0.4 mg SL tablet Commonly known as:  NITROSTAT 
1 Tab by SubLINGual route every five (5) minutes as needed for Chest Pain. 
  
pioglitazone 15 mg tablet Commonly known as:  ACTOS 
  
pravastatin 40 mg tablet Commonly known as:  PRAVACHOL 
  
triamterene-hydroCHLOROthiazide 75-50 mg per tablet Commonly known as:  Bill Olivo Where to Get Your Medications Information about where to get these medications is not yet available Ask your nurse or doctor about these medications · cephALEXin 500 mg capsule 
  
 
_______________________________ Attestations: 
Scribe Attestation Katarzyna Solano acting as a scribe for and in the presence of Rogerio Adams MD     
November 01, 2018 at 1:47 PM 
    
Provider Attestation:     
I personally performed the services described in the documentation, reviewed the documentation, as recorded by the scribe in my presence, and it accurately and completely records my words and actions. November 01, 2018 at 1:47 PM - Rogerio Adams MD   
_______________________________

## 2018-11-01 NOTE — ED TRIAGE NOTES
Patient states elevation of blood pressure today. States generally not feeling well. Patient states having BP checked at school today- 182/100. C/o headache. States hx of migraines.

## 2018-11-01 NOTE — DISCHARGE INSTRUCTIONS
Urinary Tract Infection in Women: Care Instructions  Your Care Instructions    A urinary tract infection, or UTI, is a general term for an infection anywhere between the kidneys and the urethra (where urine comes out). Most UTIs are bladder infections. They often cause pain or burning when you urinate. UTIs are caused by bacteria and can be cured with antibiotics. Be sure to complete your treatment so that the infection goes away. Follow-up care is a key part of your treatment and safety. Be sure to make and go to all appointments, and call your doctor if you are having problems. It's also a good idea to know your test results and keep a list of the medicines you take. How can you care for yourself at home? · Take your antibiotics as directed. Do not stop taking them just because you feel better. You need to take the full course of antibiotics. · Drink extra water and other fluids for the next day or two. This may help wash out the bacteria that are causing the infection. (If you have kidney, heart, or liver disease and have to limit fluids, talk with your doctor before you increase your fluid intake.)  · Avoid drinks that are carbonated or have caffeine. They can irritate the bladder. · Urinate often. Try to empty your bladder each time. · To relieve pain, take a hot bath or lay a heating pad set on low over your lower belly or genital area. Never go to sleep with a heating pad in place. To prevent UTIs  · Drink plenty of water each day. This helps you urinate often, which clears bacteria from your system. (If you have kidney, heart, or liver disease and have to limit fluids, talk with your doctor before you increase your fluid intake.)  · Urinate when you need to. · Urinate right after you have sex. · Change sanitary pads often. · Avoid douches, bubble baths, feminine hygiene sprays, and other feminine hygiene products that have deodorants.   · After going to the bathroom, wipe from front to back.  When should you call for help? Call your doctor now or seek immediate medical care if:    · Symptoms such as fever, chills, nausea, or vomiting get worse or appear for the first time.     · You have new pain in your back just below your rib cage. This is called flank pain.     · There is new blood or pus in your urine.     · You have any problems with your antibiotic medicine.    Watch closely for changes in your health, and be sure to contact your doctor if:    · You are not getting better after taking an antibiotic for 2 days.     · Your symptoms go away but then come back. Where can you learn more? Go to http://piedad-esperanza.info/. Enter W536 in the search box to learn more about \"Urinary Tract Infection in Women: Care Instructions. \"  Current as of: March 21, 2018  Content Version: 11.8  © 7811-3146 [employer]. Care instructions adapted under license by [employer] (which disclaims liability or warranty for this information). If you have questions about a medical condition or this instruction, always ask your healthcare professional. [address] any warranty or liability for your use of this information.

## 2019-02-28 ENCOUNTER — HOSPITAL ENCOUNTER (OUTPATIENT)
Age: 54
Discharge: HOME | End: 2019-02-28
Attending: PHYSICIAN ASSISTANT

## 2019-02-28 DIAGNOSIS — H90.42 SENSORINEURAL HEARING LOSS (SNHL) OF LEFT EAR WITH UNRESTRICTED HEARING OF RIGHT EAR: ICD-10-CM

## 2019-03-11 ENCOUNTER — HOSPITAL ENCOUNTER (OUTPATIENT)
Age: 54
Discharge: HOME | End: 2019-03-11
Attending: PHYSICIAN ASSISTANT
Payer: OTHER GOVERNMENT

## 2019-03-11 LAB — CREAT UR-MCNC: 1 MG/DL (ref 0.6–1.3)

## 2019-03-11 PROCEDURE — 82565 ASSAY OF CREATININE: CPT

## 2019-03-11 PROCEDURE — 70553 MRI BRAIN STEM W/O & W/DYE: CPT

## 2019-03-11 PROCEDURE — A9575 INJ GADOTERATE MEGLUMI 0.1ML: HCPCS

## 2019-03-11 PROCEDURE — 74011636320 HC RX REV CODE- 636/320

## 2019-03-11 RX ADMIN — GADOTERATE MEGLUMINE 20 ML: 376.9 INJECTION INTRAVENOUS at 18:00

## 2019-05-05 ENCOUNTER — ANESTHESIA EVENT (OUTPATIENT)
Dept: ENDOSCOPY | Age: 54
End: 2019-05-05
Payer: COMMERCIAL

## 2019-05-06 ENCOUNTER — ANESTHESIA (OUTPATIENT)
Dept: ENDOSCOPY | Age: 54
End: 2019-05-06
Payer: COMMERCIAL

## 2019-05-06 ENCOUNTER — HOSPITAL ENCOUNTER (OUTPATIENT)
Age: 54
Setting detail: OUTPATIENT SURGERY
Discharge: HOME | End: 2019-05-06
Attending: INTERNAL MEDICINE | Admitting: INTERNAL MEDICINE
Payer: COMMERCIAL

## 2019-05-06 VITALS
OXYGEN SATURATION: 100 % | HEIGHT: 58 IN | HEART RATE: 77 BPM | DIASTOLIC BLOOD PRESSURE: 62 MMHG | SYSTOLIC BLOOD PRESSURE: 124 MMHG | TEMPERATURE: 97.1 F | WEIGHT: 251.2 LBS | RESPIRATION RATE: 20 BRPM | BODY MASS INDEX: 52.73 KG/M2

## 2019-05-06 LAB
BUN BLD-MCNC: 18 MG/DL (ref 7–18)
CHLORIDE BLD-SCNC: 98 MMOL/L (ref 100–108)
GLUCOSE BLD STRIP.AUTO-MCNC: 188 MG/DL (ref 70–110)
GLUCOSE BLD STRIP.AUTO-MCNC: 211 MG/DL (ref 74–106)
HCG UR QL: NEGATIVE
HCT VFR BLD CALC: 43 % (ref 36–49)
HGB BLD-MCNC: 14.6 G/DL (ref 12–16)
POTASSIUM BLD-SCNC: 5.6 MMOL/L (ref 3.5–5.5)
SODIUM BLD-SCNC: 135 MMOL/L (ref 136–145)

## 2019-05-06 PROCEDURE — 77030018846 HC SOL IRR STRL H20 ICUM -A: Performed by: INTERNAL MEDICINE

## 2019-05-06 PROCEDURE — 74011250636 HC RX REV CODE- 250/636

## 2019-05-06 PROCEDURE — 82962 GLUCOSE BLOOD TEST: CPT

## 2019-05-06 PROCEDURE — 76040000019: Performed by: INTERNAL MEDICINE

## 2019-05-06 PROCEDURE — 82947 ASSAY GLUCOSE BLOOD QUANT: CPT

## 2019-05-06 PROCEDURE — 77030008565 HC TBNG SUC IRR ERBE -B: Performed by: INTERNAL MEDICINE

## 2019-05-06 PROCEDURE — 81025 URINE PREGNANCY TEST: CPT

## 2019-05-06 PROCEDURE — 76060000031 HC ANESTHESIA FIRST 0.5 HR: Performed by: INTERNAL MEDICINE

## 2019-05-06 PROCEDURE — 74011250636 HC RX REV CODE- 250/636: Performed by: NURSE ANESTHETIST, CERTIFIED REGISTERED

## 2019-05-06 PROCEDURE — 74011636637 HC RX REV CODE- 636/637: Performed by: NURSE ANESTHETIST, CERTIFIED REGISTERED

## 2019-05-06 PROCEDURE — 74011000250 HC RX REV CODE- 250: Performed by: NURSE ANESTHETIST, CERTIFIED REGISTERED

## 2019-05-06 RX ORDER — SODIUM CHLORIDE, SODIUM LACTATE, POTASSIUM CHLORIDE, CALCIUM CHLORIDE 600; 310; 30; 20 MG/100ML; MG/100ML; MG/100ML; MG/100ML
75 INJECTION, SOLUTION INTRAVENOUS CONTINUOUS
Status: DISCONTINUED | OUTPATIENT
Start: 2019-05-06 | End: 2019-05-06 | Stop reason: HOSPADM

## 2019-05-06 RX ORDER — INSULIN LISPRO 100 [IU]/ML
INJECTION, SOLUTION INTRAVENOUS; SUBCUTANEOUS ONCE
Status: COMPLETED | OUTPATIENT
Start: 2019-05-06 | End: 2019-05-06

## 2019-05-06 RX ORDER — DEXTROSE 50 % IN WATER (D50W) INTRAVENOUS SYRINGE
25-50 AS NEEDED
Status: DISCONTINUED | OUTPATIENT
Start: 2019-05-06 | End: 2019-05-06 | Stop reason: HOSPADM

## 2019-05-06 RX ORDER — FENTANYL CITRATE 50 UG/ML
50 INJECTION, SOLUTION INTRAMUSCULAR; INTRAVENOUS AS NEEDED
Status: DISCONTINUED | OUTPATIENT
Start: 2019-05-06 | End: 2019-05-06 | Stop reason: HOSPADM

## 2019-05-06 RX ORDER — LIDOCAINE HYDROCHLORIDE 20 MG/ML
INJECTION, SOLUTION EPIDURAL; INFILTRATION; INTRACAUDAL; PERINEURAL AS NEEDED
Status: DISCONTINUED | OUTPATIENT
Start: 2019-05-06 | End: 2019-05-06 | Stop reason: HOSPADM

## 2019-05-06 RX ORDER — LIDOCAINE HYDROCHLORIDE 10 MG/ML
0.1 INJECTION, SOLUTION EPIDURAL; INFILTRATION; INTRACAUDAL; PERINEURAL AS NEEDED
Status: DISCONTINUED | OUTPATIENT
Start: 2019-05-06 | End: 2019-05-06 | Stop reason: HOSPADM

## 2019-05-06 RX ORDER — PROPOFOL 10 MG/ML
INJECTION, EMULSION INTRAVENOUS AS NEEDED
Status: DISCONTINUED | OUTPATIENT
Start: 2019-05-06 | End: 2019-05-06 | Stop reason: HOSPADM

## 2019-05-06 RX ORDER — NALOXONE HYDROCHLORIDE 0.4 MG/ML
0.1 INJECTION, SOLUTION INTRAMUSCULAR; INTRAVENOUS; SUBCUTANEOUS ONCE
Status: DISCONTINUED | OUTPATIENT
Start: 2019-05-06 | End: 2019-05-06 | Stop reason: HOSPADM

## 2019-05-06 RX ORDER — SODIUM CHLORIDE, SODIUM LACTATE, POTASSIUM CHLORIDE, CALCIUM CHLORIDE 600; 310; 30; 20 MG/100ML; MG/100ML; MG/100ML; MG/100ML
50 INJECTION, SOLUTION INTRAVENOUS CONTINUOUS
Status: DISCONTINUED | OUTPATIENT
Start: 2019-05-06 | End: 2019-05-06 | Stop reason: HOSPADM

## 2019-05-06 RX ORDER — INSULIN LISPRO 100 [IU]/ML
INJECTION, SOLUTION INTRAVENOUS; SUBCUTANEOUS ONCE
Status: DISCONTINUED | OUTPATIENT
Start: 2019-05-06 | End: 2019-05-06 | Stop reason: HOSPADM

## 2019-05-06 RX ORDER — MAGNESIUM SULFATE 100 %
4 CRYSTALS MISCELLANEOUS AS NEEDED
Status: DISCONTINUED | OUTPATIENT
Start: 2019-05-06 | End: 2019-05-06 | Stop reason: HOSPADM

## 2019-05-06 RX ADMIN — PROPOFOL 50 MG: 10 INJECTION, EMULSION INTRAVENOUS at 08:59

## 2019-05-06 RX ADMIN — FAMOTIDINE 20 MG: 10 INJECTION INTRAVENOUS at 08:06

## 2019-05-06 RX ADMIN — PROPOFOL 30 MG: 10 INJECTION, EMULSION INTRAVENOUS at 09:08

## 2019-05-06 RX ADMIN — INSULIN LISPRO 6 UNITS: 100 INJECTION, SOLUTION INTRAVENOUS; SUBCUTANEOUS at 08:07

## 2019-05-06 RX ADMIN — PROPOFOL 50 MG: 10 INJECTION, EMULSION INTRAVENOUS at 09:03

## 2019-05-06 RX ADMIN — PROPOFOL 50 MG: 10 INJECTION, EMULSION INTRAVENOUS at 09:01

## 2019-05-06 RX ADMIN — SODIUM CHLORIDE, SODIUM LACTATE, POTASSIUM CHLORIDE, AND CALCIUM CHLORIDE 50 ML/HR: 600; 310; 30; 20 INJECTION, SOLUTION INTRAVENOUS at 08:06

## 2019-05-06 RX ADMIN — LIDOCAINE HYDROCHLORIDE 40 MG: 20 INJECTION, SOLUTION EPIDURAL; INFILTRATION; INTRACAUDAL; PERINEURAL at 08:57

## 2019-05-06 RX ADMIN — PROPOFOL 50 MG: 10 INJECTION, EMULSION INTRAVENOUS at 08:57

## 2019-05-06 NOTE — DISCHARGE INSTRUCTIONS
Colonoscopy: What to Expect at [address] [place] [address]  After you have a colonoscopy, you will stay at the clinic for 1 to 2 hours until the medicines wear off. Then you can go home. But you will need to arrange for a ride. Your doctor will tell you when you can eat and do your other usual activities. Your doctor will talk to you about when you will need your next colonoscopy. Your doctor can help you decide how often you need to be checked. This will depend on the results of your test and your risk for colorectal cancer. After the test, you may be bloated or have gas pains. You may need to pass gas. If a biopsy was done or a polyp was removed, you may have streaks of blood in your stool (feces) for a few days. Problems such as heavy rectal bleeding may not occur until several weeks after the test. This isn't common. But it can happen after polyps are removed. This care sheet gives you a general idea about how long it will take for you to recover. But each person recovers at a different pace. Follow the steps below to get better as quickly as possible. How can you care for yourself at home? Activity    · Rest when you feel tired.     · You can do your normal activities when it feels okay to do so. Diet    · Follow your doctor's directions for eating.     · Unless your doctor has told you not to, drink plenty of fluids. This helps to replace the fluids that were lost during the colon prep.     · Do not drink alcohol. Medicines    · Your doctor will tell you if and when you can restart your medicines. He or she will also give you instructions about taking any new medicines.     · If you take blood thinners, such as warfarin (Coumadin), clopidogrel (Plavix), or aspirin, be sure to talk to your doctor. He or she will tell you if and when to start taking those medicines again.  Make sure that you understand exactly what your doctor wants you to do.     · If polyps were removed or a biopsy was done during the test, your doctor may tell you not to take aspirin or other anti-inflammatory medicines for a few days. These include ibuprofen (Advil, Motrin) and naproxen (Aleve). Other instructions    · For your safety, do not drive or operate machinery until the medicine wears off and you can think clearly. Your doctor may tell you not to drive or operate machinery until the day after your test.     · Do not sign legal documents or make major decisions until the medicine wears off and you can think clearly. The anesthesia can make it hard for you to fully understand what you are agreeing to. Follow-up care is a key part of your treatment and safety. Be sure to make and go to all appointments, and call your doctor if you are having problems. It's also a good idea to know your test results and keep a list of the medicines you take. When should you call for help? Call 911 anytime you think you may need emergency care. For example, call if:    · You passed out (lost consciousness).     · You pass maroon or bloody stools.     · You have trouble breathing.    Call your doctor now or seek immediate medical care if:    · You have pain that does not get better after you take pain medicine.     · You are sick to your stomach or cannot drink fluids.     · You have new or worse belly pain.     · You have blood in your stools.     · You have a fever.     · You cannot pass stools or gas.    Watch closely for changes in your health, and be sure to contact your doctor if you have any problems. Where can you learn more? Go to http://piedad-esperanza.info/. Enter E264 in the search box to learn more about \"Colonoscopy: What to Expect at Home. \"  Current as of: March 27, 2018  Content Version: 11.9  © 1579-9836 [employer]. Care instructions adapted under license by [employer] (which disclaims liability or warranty for this information).  If you have questions about a medical condition or this instruction, always ask your healthcare professional. [place] [address] any warranty or liability for your use of this information. DISCHARGE SUMMARY from Nurse    PATIENT INSTRUCTIONS:    After general anesthesia or intravenous sedation, for 24 hours or while taking prescription Narcotics:  · Limit your activities  · Do not drive and operate hazardous machinery  · Do not make important personal or business decisions  · Do  not drink alcoholic beverages  · If you have not urinated within 8 hours after discharge, please contact your surgeon on call. Report the following to your surgeon:  · Excessive pain, swelling, redness or odor of or around the surgical area  · Temperature over 100.5  · Nausea and vomiting lasting longer than 4 hours or if unable to take medications  · Any signs of decreased circulation or nerve impairment to extremity: change in color, persistent  numbness, tingling, coldness or increase pain  · Any questions    *  Please give a list of your current medications to your Primary Care Provider. *  Please update this list whenever your medications are discontinued, doses are      changed, or new medications (including over-the-counter products) are added. *  Please carry medication information at all times in case of emergency situations. These are general instructions for a healthy lifestyle:    No smoking/ No tobacco products/ Avoid exposure to second hand smoke  Surgeon General's Warning:  Quitting smoking now greatly reduces serious risk to your health.     Obesity, smoking, and sedentary lifestyle greatly increases your risk for illness    A healthy diet, regular physical exercise & weight monitoring are important for maintaining a healthy lifestyle    You may be retaining fluid if you have a history of heart failure or if you experience any of the following symptoms:  Weight gain of 3 pounds or more overnight or 5 pounds in a week, increased swelling in our hands or feet or shortness of breath while lying flat in bed. Please call your doctor as soon as you notice any of these symptoms; do not wait until your next office visit. Recognize signs and symptoms of STROKE:    F-face looks uneven    A-arms unable to move or move unevenly    S-speech slurred or non-existent    T-time-call 911 as soon as signs and symptoms begin-DO NOT go       Back to bed or wait to see if you get better-TIME IS BRAIN. Warning Signs of HEART ATTACK     Call 911 if you have these symptoms:   Chest discomfort. Most heart attacks involve discomfort in the center of the chest that lasts more than a few minutes, or that goes away and comes back. It can feel like uncomfortable pressure, squeezing, fullness, or pain.  Discomfort in other areas of the upper body. Symptoms can include pain or discomfort in one or both arms, the back, neck, jaw, or stomach.  Shortness of breath with or without chest discomfort.  Other signs may include breaking out in a cold sweat, nausea, or lightheadedness. Don't wait more than five minutes to call 911 - MINUTES MATTER! Fast action can save your life. Calling 911 is almost always the fastest way to get lifesaving treatment. Emergency Medical Services staff can begin treatment when they arrive -- up to an hour sooner than if someone gets to the hospital by car. The discharge information has been reviewed with the patient/spouse. The patient/spouse verbalized understanding. Discharge medications reviewed with the patient/spouse and appropriate educational materials and side effects teaching were provided.   ___________________________________________________________________________________________________________________________________

## 2019-05-06 NOTE — ANESTHESIA POSTPROCEDURE EVALUATION
Procedure(s): 
COLONOSCOPY. MAC Anesthesia Post Evaluation Multimodal analgesia: multimodal analgesia used between 6 hours prior to anesthesia start to PACU discharge Patient location during evaluation: bedside Patient participation: complete - patient participated Level of consciousness: awake Pain score: 0 Pain management: adequate Airway patency: patent Anesthetic complications: no 
Cardiovascular status: stable Respiratory status: acceptable Hydration status: acceptable Post anesthesia nausea and vomiting:  controlled Vitals Value Taken Time /57 5/6/2019  9:27 AM  
Temp Pulse 73 5/6/2019  9:34 AM  
Resp 3 5/6/2019  9:34 AM  
SpO2 96 % 5/6/2019  9:34 AM  
Vitals shown include unvalidated device data.

## 2019-05-06 NOTE — H&P
[facility]   [email or web address]      Impression:   1. CRCS  Morbid obesity    Plan:     1. colo      Chief Complaint:   crcs    HPI:  Nieves Bartlett is a 47 y.o. female who is being seen on consult for the above. No Sxs, no fhx .     PMH:   Past Medical History:   Diagnosis Date    Diabetes mellitus ([facility] [address]     Hypertension     Migraine        PSH:   Past Surgical History:   Procedure Laterality Date    HX  SECTION         Social HX:   Social History     Socioeconomic History    Marital status:      Spouse name: Not on file    Number of children: Not on file    Years of education: Not on file    Highest education level: Not on file   Occupational History    Not on file   Social Needs    Financial resource strain: Not on file    Food insecurity:     Worry: Not on file     Inability: Not on file    Transportation needs:     Medical: Not on file     Non-medical: Not on file   Tobacco Use    Smoking status: Never Smoker    Smokeless tobacco: Never Used   Substance and Sexual Activity    Alcohol use: No    Drug use: No    Sexual activity: Not on file   Lifestyle    Physical activity:     Days per week: Not on file     Minutes per session: Not on file    Stress: Not on file   Relationships    Social connections:     Talks on phone: Not on file     Gets together: Not on file     Attends [employer] service: Not on file     Active member of club or organization: Not on file     Attends meetings of clubs or organizations: Not on file     Relationship status: Not on file    Intimate partner violence:     Fear of current or ex partner: Not on file     Emotionally abused: Not on file     Physically abused: Not on file     Forced sexual activity: Not on file   Other Topics Concern    Not on file   Social History Narrative    Not on file       FHX:   Family History   Problem Relation Age of Onset    Hypertension Mother     Diabetes Father     Hypertension Father     Diabetes Paternal Grandmother     Heart Disease Paternal Grandfather        Allergy:   No Known Allergies    Home Medications:     Medications Prior to Admission   Medication Sig    metoprolol succinate (TOPROL-XL) 100 mg tablet Take 100 mg by mouth daily.  pravastatin (PRAVACHOL) 40 mg tablet Take 40 mg by mouth nightly.  pioglitazone (ACTOS) 15 mg tablet Take 45 mg by mouth daily.  lisinopril (PRINIVIL, ZESTRIL) 5 mg tablet Take 5 mg by mouth daily.  triamterene-hydrochlorothiazide (MAXZIDE) 75-50 mg per tablet Take 0.5 Tabs by mouth daily.  nitroglycerin (NITROSTAT) 0.4 mg SL tablet 1 Tab by SubLINGual route every five (5) minutes as needed for Chest Pain. Review of Systems:     Constitutional: No fevers, chills, weight loss, fatigue. Skin: No rashes, pruritis, jaundice, ulcerations, erythema. HENT: No headaches, nosebleeds, sinus pressure, rhinorrhea, sore throat. Eyes: No visual changes, blurred vision, eye pain, photophobia, jaundice. Cardiovascular: No chest pain, heart palpitations. Respiratory: No cough, SOB, wheezing, chest discomfort, orthopnea. Gastrointestinal: neg   Genitourinary: No dysuria, bleeding, discharge, pyuria. Musculoskeletal: No weakness, arthralgias, wasting. Endo: No sweats. Heme: No bruising, easy bleeding. Allergies: As noted. Neurological: Cranial nerves intact. Alert and oriented. Gait not assessed. Psychiatric:  No anxiety, depression, hallucinations. Visit Vitals  /61   Pulse 76   Temp 97.7 °F (36.5 °C)   Resp 20   Ht 4' 10\" (1.473 m)   Wt 113.9 kg (251 lb 3.2 oz)   SpO2 100%   Breastfeeding? No   BMI 52.50 kg/m²       Physical Assessment:     constitutional: appearance: obese  skin: inspection: no rashes, ulcers, icterus or other lesions; no clubbing or telangiectasias. palpation: no induration or subcutaneos nodules.    eyes: inspection: normal conjunctivae and lids; no jaundice pupils: symmetrical, normoreactive to light, normal accommodation and size. ENMT: mouth: normal oral mucosa,lips and gums; good dentition. oropharynx: normal tongue, hard and soft palate; posterior pharynx without erithema, exudate or lesions. neck: thyroid: normal size, consistency and position; no masses or tenderness. respiratory: effort: normal chest excursion; no intercostal retraction or accessory muscle use. cardiovascular: abdominal aorta: normal size and position; no bruits. palpation: PMI of normal size and position; normal rhythm; no thrill or murmurs. abdominal: abdomen: normal consistency; no tenderness or masses. hernias: no hernias appreciated. liver: normal size and consistency. spleen: not palpable. rectal: hemoccult/guaiac: not performed. musculoskeletal: digits and nails: no clubbing, cyanosis, petechiae or other inflammatory conditions. gait: normal gait and station head and neck: normal range of motion; no pain, crepitation or contracture. spine/ribs/pelvis: normal range of motion; no pain, deformity or contracture. lymphatic: axilae: not palpable. groin: not palpable. neck: within normal limits. other: not palpable. neurologic: cranial nerves: II-XII normal.   psychiatric: judgement/insight: within normal limits. memory: within normal limits for recent and remote events. mood and affect: no evidence of depression, anxiety or agitation. orientation: oriented to time, space and person. Basic Metabolic Profile   No results for input(s): NA, K, CL, CO2, BUN, GLU, CA, MG, PHOS in the last 72 hours. No lab exists for component: CREAT      CBC w/Diff    No results for input(s): WBC, RBC, HGB, HCT, MCV, MCH, MCHC, RDW, PLT, HGBEXT, HCTEXT, PLTEXT in the last 72 hours. No lab exists for component: MPV No results for input(s): GRANS, LYMPH, EOS, PRO, MYELO, METAS, BLAST in the last 72 hours.     No lab exists for component: MONO, BASO     Hepatic Function   No results for input(s): ALB, TP, TBILI, GPT, SGOT, AP, AML, LPSE in the last 72 hours. No lab exists for component: Tammy Dailey MD, MTenD. [facility], [address] [place] [address]  [email or web address]

## 2019-05-06 NOTE — ANESTHESIA PREPROCEDURE EVALUATION
Relevant Problems No relevant active problems Anesthetic History No history of anesthetic complications Review of Systems / Medical History Patient summary reviewed and pertinent labs reviewed Pulmonary Within defined limits Neuro/Psych Within defined limits Cardiovascular Hypertension: well controlled Exercise tolerance: >4 METS 
  
GI/Hepatic/Renal 
Within defined limits Endo/Other Diabetes: well controlled, type 2 Morbid obesity Other Findings Physical Exam 
 
Airway Mallampati: II 
TM Distance: 4 - 6 cm Neck ROM: normal range of motion Mouth opening: Normal 
 
 Cardiovascular Regular rate and rhythm,  S1 and S2 normal,  no murmur, click, rub, or gallop Dental 
No notable dental hx Pulmonary Breath sounds clear to auscultation Abdominal 
GI exam deferred Other Findings Anesthetic Plan ASA: 3 Anesthesia type: MAC Induction: Intravenous Anesthetic plan and risks discussed with: Patient

## 2020-01-07 NOTE — ED TRIAGE NOTES
Patient presents to ER C/O right wrist pain x 24 hours.  Denies any trauma or previous hx of pain to right wrist. No

## 2020-12-04 ENCOUNTER — APPOINTMENT (OUTPATIENT)
Dept: PHYSICAL THERAPY | Age: 55
End: 2020-12-04
Payer: COMMERCIAL

## 2020-12-11 ENCOUNTER — HOSPITAL ENCOUNTER (OUTPATIENT)
Dept: PHYSICAL THERAPY | Age: 55
Discharge: HOME | End: 2020-12-11
Payer: COMMERCIAL

## 2020-12-11 PROCEDURE — 97535 SELF CARE MNGMENT TRAINING: CPT

## 2020-12-11 PROCEDURE — 97110 THERAPEUTIC EXERCISES: CPT

## 2020-12-11 PROCEDURE — 97162 PT EVAL MOD COMPLEX 30 MIN: CPT

## 2020-12-11 NOTE — PROGRESS NOTES
PT DAILY TREATMENT NOTE     Patient Name: Susan Crenshaw  Date:2020  : 1965  [x]  Patient  Verified  Payor: Adam Mitchell / Plan: Salvatore José / Product Type: HMO /    In time:135  Out time:207  Total Treatment Time (min): 32  Visit #: 1 of 8    Medicare/BCBS Only   Total Timed Codes (min):  8 1:1 Treatment Time:  32       Treatment Area: Pain in left knee [M25.562]    SUBJECTIVE  Pain Level (0-10 scale): 6  Any medication changes, allergies to medications, adverse drug reactions, diagnosis change, or new procedure performed?: [x] No    [] Yes (see summary sheet for update)  Subjective functional status/changes:   [] No changes reported  See POC    OBJECTIVE    8 min X -Eval                  -Re-Eval     10 min Therapeutic Exercise:  [] See flow sheet : HEP education and performance   Rationale: increase ROM and increase strength to improve the patients ability to perform ADLs with decreased pain    14 min Self Care/Home management:  []  See flow sheet : Educated pt of relevant anatomy, pathology, and POC. Discussed prognosis with PT and use of ice for pain management. Rationale: decrease pain  to improve the patients ability to manage self care. With   [] TE   [] TA   [] neuro   [] other: Patient Education: [x] Review HEP    [] Progressed/Changed HEP based on:   [] positioning   [] body mechanics   [] transfers   [] heat/ice application    [] other:      Other Objective/Functional Measures: see POC     Pain Level (0-10 scale) post treatment: 6    ASSESSMENT/Changes in Function: Pt is a 68-year-old female with c/o left anteromedial knee pain beginning insidiously 4 months ago. Reports that \"it feels like a rubber band that needs to pop,\" with pain in full knee extension, with prolonged standing and walking, and with stair negotiation.  Reports that she received a cortisone shot 1 month ago that helped greatly with pain but continues to increase with aforementioned activities. Special testing is not significant for ligamentous sprains but does show (+) Avinash's. Tenderness at the medial joint line without tenderness of the patellar tendon. Impairments include decreased strength, flexibility, and impaired balance and ease of ADLs. Occasionally feels like it may give way when descending stairs. Symptoms appear consistent with a left medial meniscus tear. Patient will continue to benefit from skilled PT services to modify and progress therapeutic interventions, address functional mobility deficits, address ROM deficits, address strength deficits, analyze and address soft tissue restrictions, analyze and cue movement patterns, analyze and modify body mechanics/ergonomics, assess and modify postural abnormalities, address imbalance/dizziness and instruct in home and community integration to attain remaining goals. [x]  See Plan of Care  []  See progress note/recertification  []  See Discharge Summary         Progress towards goals / Updated goals:   Short Term Goals: To be accomplished in 1 weeks:  1. Pt will report daily compliance with HEP to maximize therapeutic success. Eval: HEP assigned  Long Term Goals: To be accomplished in 4 weeks:  1. Pt will improve FOTO to 62%, demonstrating improved functional capacity for ADLs. Eval: 40%  2. Pt will tolerate gentle PROM 0-110 degrees or more without c/o pain to improve ease of self care when seated at work. Eval: 0-110 degrees B with soft end feel; pain at end range flexion and extension left knee  3. Pt will improve left LE strength to grossly 4+/5 MMT without pain to improve ease of ambulation. Eval: left LE grossly 4-/5 knee flex/ext and hip flex/ext, 4/5 ABD/add  4. Pt will report ability to negotiate stairs in her home step over step or step to step without fear of falling to improve safety with household ambulation. Eval: step to step with fear of falling; occasional feeling like left knee may give out  5.  Pt will repot 50% or greater improvement in overall symptoms, indicating improved QOL.    Eval: 0%      PLAN  []  Upgrade activities as tolerated     [x]  Continue plan of care  []  Update interventions per flow sheet       []  Discharge due to:_  []  Other:_      Zulema Aguilera, PT 12/11/2020  2:18 PM    Future Appointments   Date Time Provider Erin Rodrigues   12/14/2020  6:00 PM Verdene Cables, PT [facility]   12/17/2020  5:15 PM Verapryle Cables, PT [facility]   12/21/2020  3:45 PM Marissa Neal, PT [facility]   12/23/2020 11:30 AM Marissa Neal, PT [facility]

## 2020-12-11 NOTE — PROGRESS NOTES
[facility]  [address]  [place], [address]  [phone] fax    Plan of Care/ Statement of Necessity for Physical Therapy Services    Patient name: Cholo Vela Start of Care: 2020   Referral source: Ethan Rubin  : 1965    Medical Diagnosis: Pain in left knee [M25.562]  Payor: BHAVIN ZARCO / Plan: Christianne Murrieta / Product Type: HMO /  Onset Date:4 months    Treatment Diagnosis: left knee pain   Prior Hospitalization: see medical history Provider#: [id number]   Medications: Verified on Patient summary List    Comorbidities: diabetes, high blood pressure   Prior Level of Function: daily walks without pain. The Plan of Care and following information is based on the information from the initial evaluation. Assessment/ key information: Pt is a 59-year-old female with c/o left anteromedial knee pain beginning insidiously 4 months ago. Reports that \"it feels like a rubber band that needs to pop,\" with pain in full knee extension, with prolonged standing and walking, and with stair negotiation. Reports that she received a cortisone shot 1 month ago that helped greatly with pain but continues to increase with aforementioned activities. Special testing is not significant for ligamentous sprains but does show (+) Avinash's. Tenderness at the medial joint line without tenderness of the patellar tendon. Impairments include decreased strength, flexibility, and impaired balance and ease of ADLs. Occasionally feels like it may give way when descending stairs. Symptoms appear consistent with a left medial meniscus tear.     Patient will continue to benefit from skilled PT services to modify and progress therapeutic interventions, address functional mobility deficits, address ROM deficits, address strength deficits, analyze and address soft tissue restrictions, analyze and cue movement patterns, analyze and modify body mechanics/ergonomics, assess and modify postural abnormalities, address imbalance/dizziness and instruct in home and community integration to attain remaining goals. Evaluation Complexity History MEDIUM  Complexity : 1-2 comorbidities / personal factors will impact the outcome/ POC ; Examination MEDIUM Complexity : 3 Standardized tests and measures addressing body structure, function, activity limitation and / or participation in recreation  ;Presentation MEDIUM Complexity : Evolving with changing characteristics  ; Clinical Decision Making MEDIUM Complexity : FOTO score of 26-74  Overall Complexity Rating: MEDIUM  Problem List: pain affecting function, decrease ROM, decrease strength, edema affecting function, impaired gait/ balance, decrease ADL/ functional abilitiies, decrease activity tolerance, decrease flexibility/ joint mobility and decrease transfer abilities   Treatment Plan may include any combination of the following: Therapeutic exercise, Therapeutic activities, Neuromuscular re-education, Physical agent/modality, Gait/balance training, Manual therapy, Aquatic therapy, Patient education, Self Care training, Functional mobility training, Home safety training and Stair training  Patient / Family readiness to learn indicated by: asking questions, trying to perform skills and interest  Persons(s) to be included in education: patient (P)  Barriers to Learning/Limitations: None  Patient Goal (s): for the pain to stop  Patient Self Reported Health Status: fair  Rehabilitation Potential: fair    Short Term Goals: To be accomplished in 1 weeks:  1. Pt will report daily compliance with HEP to maximize therapeutic success. Long Term Goals: To be accomplished in 4 weeks:  1. Pt will improve FOTO to 62%, demonstrating improved functional capacity for ADLs. 2. Pt will tolerate gentle PROM 0-110 degrees or more without c/o pain to improve ease of self care when seated at work.   3. Pt will improve left LE strength to grossly 4+/5 MMT without pain to improve ease of ambulation. 4. Pt will report ability to negotiate stairs in her home step over step or step to step without fear of falling to improve safety with household ambulation. 5. Pt will repot 50% or greater improvement in overall symptoms, indicating improved QOL. Frequency / Duration: Patient to be seen 2 times per week for 4 weeks. Patient/ Caregiver education and instruction: Diagnosis, prognosis, self care, activity modification, brace/ splint application and exercises   [x]  Plan of care has been reviewed with VILMA Mcguier, PT 12/11/2020 2:58 PM    ________________________________________________________________________    I certify that the above Therapy Services are being furnished while the patient is under my care. I agree with the treatment plan and certify that this therapy is necessary.     Physician's Signature:____________Date:_________TIME:________    ** Signature, Date and Time must be completed for valid certification **    Please sign and return to [facility] [address]  [address]  [place], [address]  [phone] fax

## 2020-12-14 ENCOUNTER — HOSPITAL ENCOUNTER (OUTPATIENT)
Dept: PHYSICAL THERAPY | Age: 55
Discharge: HOME | End: 2020-12-14
Payer: COMMERCIAL

## 2020-12-14 PROCEDURE — 97140 MANUAL THERAPY 1/> REGIONS: CPT

## 2020-12-14 PROCEDURE — 97110 THERAPEUTIC EXERCISES: CPT

## 2020-12-15 NOTE — PROGRESS NOTES
PT DAILY TREATMENT NOTE     Patient Name: Maryan Mccormack  Date:2020  : 1965  [x]  Patient  Verified  Payor: Nanci Rock / Plan: Quinn Pierre / Product Type: HMO /    In time:6:03  Out time:6:57  Total Treatment Time (min): 47  Visit #: 2 of 8    Medicare/BCBS Only   Total Timed Codes (min):  44 1:1 Treatment Time:  44       Treatment Area: Pain in left knee [M25.562]    SUBJECTIVE  Pain Level (0-10 scale): 7/10  Any medication changes, allergies to medications, adverse drug reactions, diagnosis change, or new procedure performed?: [x] No    [] Yes (see summary sheet for update)  Subjective functional status/changes:   [] No changes reported  The patient states that her knee was really sore on Saturday, and she felt that it became more painful after her exam.      OBJECTIVE  Modality rationale: decrease edema, decrease inflammation and decrease pain to improve the patients ability to improve ADL ease. Min Type Additional Details   10 [x]  Ice     []  heat  []  Ice massage  []  Laser   []  Anodyne Position: supine in elevation  Location: left knee   [] Skin assessment post-treatment:  []intact []redness- no adverse reaction    []redness  adverse reaction:     36 min Therapeutic Exercise:  [] See flow sheet :   Rationale: increase ROM and increase strength to improve the patients ability to improve ADL ease. 8 min Manual Therapy:  Medial hamstring MFR with gentle flexion PROM   The manual therapy interventions were performed at a separate and distinct time from the therapeutic activities interventions. Rationale: decrease pain, increase ROM and increase tissue extensibility to improve ADL ease. With   [] TE   [] TA   [] neuro   [] other: Patient Education: [x] Review HEP    [] Progressed/Changed HEP based on:   [] positioning   [] body mechanics   [] transfers   [] heat/ice application    [] other:      Other Objective/Functional Measures:   First follow-up.  Held many interventions during session due to reports of increased pain following initial evaluation. The patient denied increase in pain throughout session despite continual questioning of PT in order to ensure her session did not increase her pain. Pain Level (0-10 scale) post treatment: 4/10    ASSESSMENT/Changes in Function: The patient had good pain control post session and was encouraged to use ice at home for pain control as she responded quite positively to this modality. She has notable edema of her right anteromedial aspect of her left knee and she states it has remained this way since about October. The patient left in less pain, PT advised her to monitor her pain levels tomorrow in order to guide session progressions or regressions later in the week. She left in no apparent distress and thanked PT for session upon departure. Patient will continue to benefit from skilled PT services to modify and progress therapeutic interventions, address functional mobility deficits, address ROM deficits, address strength deficits, analyze and address soft tissue restrictions, analyze and cue movement patterns, analyze and modify body mechanics/ergonomics, assess and modify postural abnormalities and instruct in home and community integration to attain remaining goals. []  See Plan of Care  []  See progress note/recertification  []  See Discharge Summary         Progress towards goals / Updated goals:  Short Term Goals: To be accomplished in 1 weeks:  1. Pt will report daily compliance with HEP to maximize therapeutic success. Long Term Goals: To be accomplished in 4 weeks:  1. Pt will improve FOTO to 62%, demonstrating improved functional capacity for ADLs. 2. Pt will tolerate gentle PROM 0-110 degrees or more without c/o pain to improve ease of self care when seated at work. 3. Pt will improve left LE strength to grossly 4+/5 MMT without pain to improve ease of ambulation.   4. Pt will report ability to negotiate stairs in her home step over step or step to step without fear of falling to improve safety with household ambulation. 5. Pt will repot 50% or greater improvement in overall symptoms, indicating improved QOL.     PLAN  []  Upgrade activities as tolerated     [x]  Continue plan of care  []  Update interventions per flow sheet       []  Discharge due to:_  []  Other:_      Douglas Delacruz, PT 12/14/2020  7:06 PM    Future Appointments   Date Time Provider Erni Rodrigues   12/17/2020  5:15 PM [facility]   12/21/2020  3:45 PM [facility]   12/23/2020 11:30 AM Alexis Neal, PT [facility]

## 2020-12-17 ENCOUNTER — HOSPITAL ENCOUNTER (OUTPATIENT)
Dept: PHYSICAL THERAPY | Age: 55
Discharge: HOME | End: 2020-12-17
Payer: COMMERCIAL

## 2020-12-17 PROCEDURE — 97110 THERAPEUTIC EXERCISES: CPT

## 2020-12-17 NOTE — PROGRESS NOTES
PT DAILY TREATMENT NOTE     Patient Name: Mayra Comes  Date:2020  : 1965  [x]  Patient  Verified  Payor: Lauryn Va / Plan: Yahaira Slight / Product Type: HMO /    In time: 5:07  Out time: 5:50  Total Treatment Time (min): 43  Visit #: 3 of 8    Medicare/BCBS Only   Total Timed Codes (min):  33 1:1 Treatment Time:  33     Treatment Area: Pain in left knee [M25.562]    SUBJECTIVE  Pain Level (0-10 scale): 4/10  Any medication changes, allergies to medications, adverse drug reactions, diagnosis change, or new procedure performed?: [x] No    [] Yes (see summary sheet for update)  Subjective functional status/changes:   [] No changes reported  The patient reports that her knee is sore today due to having to run up and down the stairs yesterday at work due to a fire alarm going off. She states she has been using a cold pack and it has been helping significantly though. She arrives in less pain today. OBJECTIVE  33 min Therapeutic Exercise:  [x] See flow sheet :   Rationale: increase ROM and increase strength to improve the patients ability to improve ADL ease. Modality rationale: decrease edema, decrease inflammation and decrease pain to improve the patients ability to improve ADL ease.     Min Type Additional Details    [] Estim:  []Unatt       []IFC  []Premod                        []Other:  []w/ice   []w/heat  Position:  Location:    [] Estim: []Att    []TENS instruct  []NMES                    []Other:  []w/US   []w/ice   []w/heat  Position:  Location:    []  Traction: [] Cervical       []Lumbar                       [] Prone          []Supine                       []Intermittent   []Continuous Lbs:  [] before manual  [] after manual    []  Ultrasound: []Continuous   [] Pulsed                           []1MHz   []3MHz Location:  W/cm2:    []  Iontophoresis with dexamethasone         Location: [] Take home patch   [] In clinic   10 [x]  Ice     []  heat  []  Ice massage  [] Laser   []  Anodyne Position: supine with LEs elevated  Location: left knee    []  Laser with stim  []  Other: Position:  Location:    []  Vasopneumatic Device Pressure:       [] lo [] med [] hi   Temperature: [] lo [] med [] hi   [] Skin assessment post-treatment:  []intact []redness- no adverse reaction    []redness  adverse reaction:     With   [] TE   [] TA   [] neuro   [] other: Patient Education: [x] Review HEP    [] Progressed/Changed HEP based on:   [] positioning   [] body mechanics   [] transfers   [] heat/ice application    [] other:      Other Objective/Functional Measures:      Pain Level (0-10 scale) post treatment: 2/10    ASSESSMENT/Changes in Function: The patient did have increased pain today during session noting an increase following hip x 3. Opted to hold further progressions due to this pain. She did response well to modalities post session and her pain elvel decreased to a 5/10. Patient will continue to benefit from skilled PT services to modify and progress therapeutic interventions, address functional mobility deficits, address ROM deficits, address strength deficits, analyze and address soft tissue restrictions, analyze and cue movement patterns, analyze and modify body mechanics/ergonomics, assess and modify postural abnormalities and instruct in home and community integration to attain remaining goals. []  See Plan of Care  []  See progress note/recertification  []  See Discharge Summary         Progress towards goals / Updated goals:  Short Term Goals: To be accomplished in 1 weeks:  1. Pt will report daily compliance with HEP to maximize therapeutic success. Eval: HEP assigned   Current: The patient reports compliance 12/17/2020  Long Term Goals: To be accomplished in 4 weeks:  1. Pt will improve FOTO to 62%, demonstrating improved functional capacity for ADLs. Eval: 40%  2.  Pt will tolerate gentle PROM 0-110 degrees or more without c/o pain to improve ease of self care when seated at work. Eval: 0-110 degrees B with soft end feel; pain at end range flexion and extension left knee  3. Pt will improve left LE strength to grossly 4+/5 MMT without pain to improve ease of ambulation. Eval: left LE grossly 4-/5 knee flex/ext and hip flex/ext, 4/5 ABD/add  4. Pt will report ability to negotiate stairs in her home step over step or step to step without fear of falling to improve safety with household ambulation. Eval: step to step with fear of falling; occasional feeling like left knee may give out  5. Pt will repot 50% or greater improvement in overall symptoms, indicating improved QOL.               Eval: 0%        PLAN  []  Upgrade activities as tolerated     [x]  Continue plan of care  []  Update interventions per flow sheet       []  Discharge due to:_  []  Other:_      Annette Gross, PT 12/17/2020  5:35 PM    Future Appointments   Date Time Provider Erin Rodrigues   12/21/2020  3:45 PM Kenna Davis, PT [facility]   12/23/2020 11:30 AM Chelsi Neal, PT [facility]

## 2020-12-23 ENCOUNTER — APPOINTMENT (OUTPATIENT)
Dept: PHYSICAL THERAPY | Age: 55
End: 2020-12-23
Payer: COMMERCIAL

## 2021-01-05 ENCOUNTER — HOSPITAL ENCOUNTER (OUTPATIENT)
Dept: PHYSICAL THERAPY | Age: 56
Discharge: HOME | End: 2021-01-05
Payer: COMMERCIAL

## 2021-01-05 PROCEDURE — 97110 THERAPEUTIC EXERCISES: CPT

## 2021-01-05 PROCEDURE — 97112 NEUROMUSCULAR REEDUCATION: CPT

## 2021-01-05 NOTE — PROGRESS NOTES
PT DAILY TREATMENT NOTE     Patient Name: Zeynep Nichols  Date:2021  : 1965  [x]  Patient  Verified  Payor: Yisel Fuentes / Plan: Hiwot Garcia / Product Type: HMO /    In time:3:47  Out time:4:26  Total Treatment Time (min): 39  Visit #: 4 of 8    Medicare/BCBS Only   Total Timed Codes (min):  29 1:1 Treatment Time:  29       Treatment Area: Pain in left knee [M25.562]    SUBJECTIVE  Pain Level (0-10 scale): 2/10  Any medication changes, allergies to medications, adverse drug reactions, diagnosis change, or new procedure performed?: [x] No    [] Yes (see summary sheet for update)  Subjective functional status/changes:   [] No changes reported  The patient states that her knee has been doing fairly well and has noted that her symptoms have decreased. OBJECTIVE  Modality rationale: decrease edema, decrease inflammation and decrease pain to improve the patients ability to improve ADL ease. Min Type Additional Details   10 [x]  Vasopneumatic Device Pressure:       [x] lo [] med [] hi   Temperature: [x] lo [] med [] hi   [] Skin assessment post-treatment:  []intact []redness- no adverse reaction    []redness  adverse reaction:    19 min Therapeutic Exercise:  [x] See flow sheet :   Rationale: increase ROM and increase strength to improve the patients ability to improve ADL ease. 10 min Neuromuscular Re-education:  [x]  See flow sheet :   Rationale: increase ROM and increase strength  to improve the patients ability to improve ADL ease.        With   [] TE   [] TA   [] neuro   [] other: Patient Education: [x] Review HEP    [] Progressed/Changed HEP based on:   [] positioning   [] body mechanics   [] transfers   [] heat/ice application    [] other:      Other Objective/Functional Measures:   Left knee flexion 112 degrees left, 110 degrees right     Pain Level (0-10 scale) post treatment: 0/10    ASSESSMENT/Changes in Function: The patient has improved strength to 5/5 MMT quads/HS flexibility. Her range of motion is symmetrical bilaterally. She states she follows up with MD later this week. Patient will continue to benefit from skilled PT services to modify and progress therapeutic interventions, address functional mobility deficits, address ROM deficits, address strength deficits, analyze and address soft tissue restrictions, analyze and cue movement patterns, analyze and modify body mechanics/ergonomics, assess and modify postural abnormalities and instruct in home and community integration to attain remaining goals. []  See Plan of Care  []  See progress note/recertification  []  See Discharge Summary         Progress towards goals / Updated goals:  Short Term Goals: To be accomplished in 1 weeks:  1. Pt will report daily compliance with HEP to maximize therapeutic success.              Eval: HEP assigned              Current: The patient reports compliance 12/17/2020  Long Term Goals: To be accomplished in 4 weeks:  1. Pt will improve FOTO to 62%, demonstrating improved functional capacity for ADLs.             Eval: 40%  2. Pt will tolerate gentle PROM 0-110 degrees or more without c/o pain to improve ease of self care when seated at work.  Lynn Gun: 0-110 degrees B with soft end feel; pain at end range flexion and extension left knee   Current: symmetrical 112 degrees without pain 1/05/2020  3. Pt will improve left LE strength to grossly 4+/5 MMT without pain to improve ease of ambulation.              Eval: left LE grossly 4-/5 knee flex/ext and hip flex/ext, 4/5 ABD/add   Current: 5/5 MMT quads/HS 1/05/2020  4. Pt will report ability to negotiate stairs in her home step over step or step to step without fear of falling to improve safety with household ambulation.              Eval: step to step with fear of falling; occasional feeling like left knee may give out   5.  Pt will repot 50% or greater improvement in overall symptoms, indicating improved QOL.              Eval: 0%     PLAN  []  Upgrade activities as tolerated     [x]  Continue plan of care  []  Update interventions per flow sheet       []  Discharge due to:_  []  Other:_      Sheron Grimes, PT 1/5/2021  3:56 PM    Future Appointments   Date Time Provider Erin Rodrigues   1/8/2021  1:45 PM [facility]   1/12/2021  3:45 PM [facility]   1/15/2021  1:45 PM [facility]

## 2021-01-08 ENCOUNTER — APPOINTMENT (OUTPATIENT)
Dept: PHYSICAL THERAPY | Age: 56
End: 2021-01-08
Payer: COMMERCIAL

## 2021-01-12 ENCOUNTER — APPOINTMENT (OUTPATIENT)
Dept: PHYSICAL THERAPY | Age: 56
End: 2021-01-12
Payer: COMMERCIAL

## 2021-01-15 ENCOUNTER — APPOINTMENT (OUTPATIENT)
Dept: PHYSICAL THERAPY | Age: 56
End: 2021-01-15
Payer: COMMERCIAL

## 2021-02-05 ENCOUNTER — TRANSCRIBE ORDER (OUTPATIENT)
Dept: SCHEDULING | Age: 56
End: 2021-02-05

## 2021-02-09 ENCOUNTER — TRANSCRIBE ORDER (OUTPATIENT)
Dept: SCHEDULING | Age: 56
End: 2021-02-09

## 2021-02-09 DIAGNOSIS — M25.569 PAIN IN JOINT, LOWER LEG: Primary | ICD-10-CM

## 2021-02-10 NOTE — PROGRESS NOTES
[facility]  [address]  [place], [address]  [phone] fax    Physical Therapy Discharge Summary  Patient name: Leticia Ralph Start of Care: 2020   Referral source: Ras Beth,  : 1965                Medical Diagnosis: Pain in left knee [M25.562]  Payor: BLUE CROSS / Plan: [employer] / Product Type: HMO /  Onset Date:4 months                Treatment Diagnosis: left knee pain   Prior Hospitalization: see medical history Provider#: [id number]   Medications: Verified on Patient summary List    Comorbidities: diabetes, high blood pressure   Prior Level of Function: daily walks without pain. Visits from Start of Care: 4   Missed Visits: 2  Reporting Period : 2021 to 2021      Summary of Care:  Short Term Goals: To be accomplished in 1 weeks:  1. Pt will report daily compliance with HEP to maximize therapeutic success.              Eval: HEP assigned              Current: The patient reports compliance 2020  Long Term Goals: To be accomplished in 4 weeks:  1. Pt will improve FOTO to 62%, demonstrating improved functional capacity for ADLs.             Eval: 40%  2. Pt will tolerate gentle PROM 0-110 degrees or more without c/o pain to improve ease of self care when seated at work.  Chetan Hoes: 0-110 degrees B with soft end feel; pain at end range flexion and extension left knee              Current: symmetrical 112 degrees without pain 2020  3. Pt will improve left LE strength to grossly 4+/5 MMT without pain to improve ease of ambulation.              Eval: left LE grossly 4-/5 knee flex/ext and hip flex/ext, 4/5 ABD/add              Current: 5/5 MMT quads/HS 2020  4.  Pt will report ability to negotiate stairs in her home step over step or step to step without fear of falling to improve safety with household ambulation.              Eval: step to step with fear of falling; occasional feeling like left knee may give out   5. Pt will repot 50% or greater improvement in overall symptoms, indicating improved QOL.             Eval: 0%    ASSESSMENT/RECOMMENDATIONS: Unable to further assess progress towards goals at this time due to non-compliance/lack of attendance. DC at this time with no further instructions to the patient.   Thank you for this referral.    [x]Discontinue therapy: []Patient has reached or is progressing toward set goals      [x]Patient is non-compliant or has abdicated      []Due to lack of appreciable progress towards set [address], PT 2/10/2021 8:15 AM

## 2021-03-01 ENCOUNTER — HOSPITAL ENCOUNTER (OUTPATIENT)
Age: 56
Discharge: HOME | End: 2021-03-01
Attending: FAMILY MEDICINE
Payer: COMMERCIAL

## 2021-03-01 DIAGNOSIS — M25.569 PAIN IN JOINT, LOWER LEG: ICD-10-CM

## 2021-03-01 PROCEDURE — 73721 MRI JNT OF LWR EXTRE W/O DYE: CPT

## 2021-03-09 ENCOUNTER — OFFICE VISIT (OUTPATIENT)
Dept: ORTHOPEDIC SURGERY | Age: 56
End: 2021-03-09

## 2021-03-09 VITALS
BODY MASS INDEX: 58.14 KG/M2 | WEIGHT: 277 LBS | SYSTOLIC BLOOD PRESSURE: 132 MMHG | OXYGEN SATURATION: 97 % | HEIGHT: 58 IN | TEMPERATURE: 96.9 F | RESPIRATION RATE: 18 BRPM | HEART RATE: 56 BPM | DIASTOLIC BLOOD PRESSURE: 64 MMHG

## 2021-03-09 DIAGNOSIS — M17.12 PRIMARY OSTEOARTHRITIS OF LEFT KNEE: Primary | ICD-10-CM

## 2021-03-09 DIAGNOSIS — M25.562 LEFT KNEE PAIN, UNSPECIFIED CHRONICITY: ICD-10-CM

## 2021-03-09 DIAGNOSIS — S83.242A TEAR OF MEDIAL MENISCUS OF LEFT KNEE, CURRENT, UNSPECIFIED TEAR TYPE, INITIAL ENCOUNTER: ICD-10-CM

## 2021-03-09 PROCEDURE — 73560 X-RAY EXAM OF KNEE 1 OR 2: CPT | Performed by: ORTHOPAEDIC SURGERY

## 2021-03-09 PROCEDURE — 99203 OFFICE O/P NEW LOW 30 MIN: CPT | Performed by: ORTHOPAEDIC SURGERY

## 2021-03-09 PROCEDURE — 20611 DRAIN/INJ JOINT/BURSA W/US: CPT | Performed by: ORTHOPAEDIC SURGERY

## 2021-03-09 RX ORDER — CELECOXIB 200 MG/1
200 CAPSULE ORAL 2 TIMES DAILY
Qty: 60 CAP | Refills: 0 | Status: SHIPPED | OUTPATIENT
Start: 2021-03-09

## 2021-03-09 RX ORDER — PANTOPRAZOLE SODIUM 40 MG/1
40 TABLET, DELAYED RELEASE ORAL DAILY
COMMUNITY

## 2021-03-09 RX ORDER — GLIMEPIRIDE 4 MG/1
TABLET ORAL
COMMUNITY

## 2021-03-09 RX ORDER — TRIAMCINOLONE ACETONIDE 40 MG/ML
40 INJECTION, SUSPENSION INTRA-ARTICULAR; INTRAMUSCULAR ONCE
Status: COMPLETED | OUTPATIENT
Start: 2021-03-09 | End: 2021-03-09

## 2021-03-09 RX ORDER — ATORVASTATIN CALCIUM 40 MG/1
TABLET, FILM COATED ORAL DAILY
COMMUNITY

## 2021-03-09 RX ADMIN — TRIAMCINOLONE ACETONIDE 40 MG: 40 INJECTION, SUSPENSION INTRA-ARTICULAR; INTRAMUSCULAR at 16:44

## 2021-03-09 NOTE — PROGRESS NOTES
Graciela Serna  1965   Chief Complaint   Patient presents with    Knee Pain     left knee pain        HISTORY OF PRESENT ILLNESS  Graciela Serna is a 54 y.o. female who presents today for evaluation of left knee pain. She rates her pain 3/10 today. Pain has been present since July 2020. No specific injury. Had cortisone injection by PCP in November/December which provided minimal relief. Pain limits her with walking. Has tried PT for 2 weeks but stopped going because she had an increase in pain, could not put pressure on the leg. Has tried taking Aleve and pain medication. Patient describes the pain as feeling like a rubber band that needs to pop that is Constant in nature. Symptoms are worse with walking, Activity and is better with  NSAID. Associated symptoms include nothing. Since problem started, it: is unchanged. Pain does not wake patient up at night. Has taken Aleve and pain medication for the problem. Has tried following treatments: Injections:YES; Brace:NO;  Therapy:YES; Cane/Crutch:NO       No Known Allergies     Past Medical History:   Diagnosis Date    Diabetes mellitus ([facility] [place] [address]     Hypertension     Migraine       Social History     Socioeconomic History    Marital status:      Spouse name: Not on file    Number of children: Not on file    Years of education: Not on file    Highest education level: Not on file   Occupational History    Not on file   Social Needs    Financial resource strain: Not on file    Food insecurity     Worry: Not on file     Inability: Not on file    Transportation needs     Medical: Not on file     Non-medical: Not on file   Tobacco Use    Smoking status: Never Smoker    Smokeless tobacco: Never Used   Substance and Sexual Activity    Alcohol use: No    Drug use: No    Sexual activity: Not on file   Lifestyle    Physical activity     Days per week: Not on file     Minutes per session: Not on file    Stress: Not on file Relationships    Social connections     Talks on phone: Not on file     Gets together: Not on file     Attends Sabianism service: Not on file     Active member of club or organization: Not on file     Attends meetings of clubs or organizations: Not on file     Relationship status: Not on file    Intimate partner violence     Fear of current or ex partner: Not on file     Emotionally abused: Not on file     Physically abused: Not on file     Forced sexual activity: Not on file   Other Topics Concern    Not on file   Social History Narrative    Not on file      Past Surgical History:   Procedure Laterality Date    COLONOSCOPY N/A 5/6/2019    COLONOSCOPY performed by Agustina Orantes MD at [address]  [facility]        Family History   Problem Relation Age of Onset    Hypertension Mother     Diabetes Father     Hypertension Father     Diabetes Paternal Grandmother     Heart Disease Paternal Grandfather       Current Outpatient Medications   Medication Sig    glimepiride (AMARYL) 4 mg tablet Take  by mouth every morning.  atorvastatin (LIPITOR) 40 mg tablet Take  by mouth daily.  pantoprazole (PROTONIX) 40 mg tablet Take 40 mg by mouth daily.  celecoxib (CeleBREX) 200 mg capsule Take 1 Cap by mouth two (2) times a day. 200 MG BID X 1 week then once per day after 1 week.  metoprolol succinate (TOPROL-XL) 100 mg tablet Take 100 mg by mouth daily.  nitroglycerin (NITROSTAT) 0.4 mg SL tablet 1 Tab by SubLINGual route every five (5) minutes as needed for Chest Pain.  pioglitazone (ACTOS) 15 mg tablet Take 45 mg by mouth daily.  triamterene-hydrochlorothiazide (MAXZIDE) 75-50 mg per tablet Take 0.5 Tabs by mouth daily.  pravastatin (PRAVACHOL) 40 mg tablet Take 40 mg by mouth nightly.  lisinopril (PRINIVIL, ZESTRIL) 5 mg tablet Take 5 mg by mouth daily.      Current Facility-Administered Medications   Medication Dose Route Frequency    triamcinolone acetonide (KENALOG-40) 40 mg/mL injection 40 mg  40 mg Intra artICUlar ONCE       REVIEW OF SYSTEM   Patient denies: Weight loss, Fever/Chills, HA, Visual changes, Fatigue, Chest pain, SOB, Abdominal pain, N/V/D/C, Blood in stool or urine, Edema. Pertinent positive as above in HPI. All others were negative    PHYSICAL EXAM:   Visit Vitals  /64 (BP 1 Location: Right upper arm, BP Patient Position: Sitting, BP Cuff Size: Thigh)   Pulse (!) 56   Temp 96.9 °F (36.1 °C) (Temporal)   Resp 18   Ht 4' 10\" (1.473 m)   Wt 277 lb (125.6 kg)   SpO2 97%   BMI 57.89 kg/m²     The patient is a well-developed, well-nourished female   in no acute distress. The patient is alert and oriented times three. The patient is alert and oriented times three. Mood and affect are normal.  LYMPHATIC: lymph nodes are not enlarged and are within normal limits  SKIN: normal in color and non tender to palpation. There are no bruises or abrasions noted. NEUROLOGICAL: Motor sensory exam is within normal limits. Reflexes are equal bilaterally. There is normal sensation to pinprick and light touch  MUSCULOSKELETAL:   Examination Left knee   Skin Intact   Range of motion    Effusion +   Medial joint line tenderness +   Lateral joint line tenderness +   Tenderness Pes Bursa -   Tenderness insertion MCL -   Tenderness insertion LCL -   Avinashs -   Patella crepitus +   Patella grind +   Lachman -   Pivot shift -   Anterior drawer -   Posterior drawer -   Varus stress -   Valgus stress -   Neurovascular Intact   Calf Swelling and Tenderness to Palpation -   Nat's Test -   Hamstring Cord Tightness -       PROCEDURE:  Left Knee Injection with Ultrasound Guidance    Indication:Left Knee pain/swelling    After sterile prep, 4 cc of Xylocaine and 1 cc of Kenalog were injected into the left Knee.  Intra-articular Ultrasound images captured using [address] [place] [address] Compression Kinetics Ultrasound machine using a frequency of 10 MHz with a linear transducer and scanned into patient's chart. [facility]  OFFICE PROCEDURE PROGRESS NOTE        Chart reviewed for the following:  Srinivas Hernandez M.D, have reviewed the History, Physical and updated the Allergic reactions for [address] performed immediately prior to start of procedure:  Srinivas Hernandez M.D, have performed the following reviews on [address] prior to the start of the procedure:            * Patient was identified by name and date of birth   * Agreement on procedure being performed was verified  * Risks and Benefits explained to the patient  * Procedure site verified and marked as necessary  * Patient was positioned for comfort  * Needle placement confirmed by ultrasound  * Consent was signed and verified     Time: 4:35 PM     Date of procedure: 3/9/2021    Procedure performed by:  Smitha Jimenez M.D    Provider assisted by: (see medication administration)    How tolerated by patient: tolerated the procedure well with no complications    Comments: none      IMAGING: XR of left knee with 2 views obtained in the office dated 3/09/2021 was reviewed and read by Dr. Ronnie Gant: Marked degenerative arthritis in all 3 compartments with varus alignment. MRI of left knee dated 3/01/2021 was reviewed and read by Dr. Ronnie Gant:   IMPRESSION:  Complex, degenerative tear/maceration of the medial meniscus with extrusion. Broad areas of near full-thickness cartilage loss of the weightbearing medial compartment. Less severe lateral and patellofemoral compartment chondrosis. Mild joint effusion. Small Baker's cyst      IMPRESSION:      ICD-10-CM ICD-9-CM    1. Primary osteoarthritis of left knee  M17.12 715.16 celecoxib (CeleBREX) 200 mg capsule      REFERRAL TO WEIGHT LOSS      triamcinolone acetonide (KENALOG-40) 40 mg/mL injection 40 mg      ARTHROCENTESIS ASPIR&/INJ MAJOR JT/BURSA W/US   2.  Left knee pain, unspecified chronicity  M25.562 719.46 AMB POC XRAY, KNEE; 1/2 VIEWS   3. Tear of medial meniscus of left knee, current, unspecified tear type, initial encounter  S83.242A 836.0 celecoxib (CeleBREX) 200 mg capsule      REFERRAL TO WEIGHT LOSS      triamcinolone acetonide (KENALOG-40) 40 mg/mL injection 40 mg      ARTHROCENTESIS ASPIR&/INJ MAJOR JT/BURSA W/US        PLAN:  1. Pt presents today with left knee pain due to primary OA and a MMT and I would like to try a cortisone injection today. Was also given prescription for Celebrex today. I advised the patient on the importance of dramatic weight loss, will refer to weight loss clinic. Advised the patient that she will likely need a knee replacement in the future. Will see her back in 3 weeks. Risk factors include: dm, htn, BMI>55  2. No ultrasound exam indicated today  3. Yes cortisone injection indicated today L KNEE US  4. No Physical/Occupational Therapy indicated today  5. No diagnostic test indicated today:   6. No durable medical equipment indicated today  7. Yes referral indicated today WEIGHT LOSS CLINIC  8. Yes medications indicated today: CELEBREX  9. No Narcotic indicated today       RTC 3 weeks      Scribed by Nadeem Irizarry) as dictated by Yfn Parada MD    I, Dr. Yfn Parada, confirm that all documentation is accurate.     Yfn Parada M.D.   [facility]

## 2021-03-30 ENCOUNTER — OFFICE VISIT (OUTPATIENT)
Dept: ORTHOPEDIC SURGERY | Age: 56
End: 2021-03-30
Payer: COMMERCIAL

## 2021-03-30 VITALS
HEIGHT: 58 IN | RESPIRATION RATE: 16 BRPM | WEIGHT: 277 LBS | HEART RATE: 77 BPM | BODY MASS INDEX: 58.14 KG/M2 | TEMPERATURE: 97.1 F | OXYGEN SATURATION: 100 %

## 2021-03-30 DIAGNOSIS — M70.42 PREPATELLAR BURSITIS OF LEFT KNEE: ICD-10-CM

## 2021-03-30 DIAGNOSIS — M17.12 PRIMARY OSTEOARTHRITIS OF LEFT KNEE: Primary | ICD-10-CM

## 2021-03-30 PROCEDURE — 99212 OFFICE O/P EST SF 10 MIN: CPT | Performed by: ORTHOPAEDIC SURGERY

## 2021-03-30 NOTE — PROGRESS NOTES
Flora Clemente  1965   Chief Complaint   Patient presents with    Knee Pain     Left knee        HISTORY OF PRESENT ILLNESS  Flora Clemente is a 54 y.o. female who presents today for reevaluation of left knee. Patient rates pain as 2/10 today. Pain has been present since July 2020. No specific injury. Had cortisone injection by PCP in November/December which provided minimal relief. Has tried PT for 2 weeks but stopped going because she had an increase in pain, could not put pressure on the leg. Has tried taking Aleve and pain medication. At last OV on 3/09/2021, patient had a cortisone injection which provided good relief. She states she is about 80-90% better. Has pain with prolonged walking and increased activity but she notes overall improvement. She does complain of a swollen \"pouch\" in the knee today. Has been taking Celebrex. Patient denies any fever, chills, chest pain, shortness of breath or calf pain. The remainder of the review of systems is negative. There are no new illness or injuries to report since last seen in the office. There are no changes to medications, allergies, family or social history. Pain Assessment  3/30/2021   Location of Pain Knee   Location Modifiers Left   Severity of Pain 2   Quality of Pain Aching   Quality of Pain Comment -   Duration of Pain Persistent   Frequency of Pain Constant   Aggravating Factors Walking;Standing   Aggravating Factors Comment -   Limiting Behavior Yes   Relieving Factors Elevation   Relieving Factors Comment -   Result of Injury -       PHYSICAL EXAM:   Visit Vitals  Pulse 77   Temp 97.1 °F (36.2 °C) (Temporal)   Resp 16   Ht 4' 10\" (1.473 m)   Wt 277 lb (125.6 kg)   SpO2 100%   BMI 57.89 kg/m²     The patient is a well-developed, well-nourished female   in no acute distress. The patient is alert and oriented times three. The patient is alert and oriented times three.  Mood and affect are normal.  LYMPHATIC: lymph nodes are not enlarged and are within normal limits  SKIN: normal in color and non tender to palpation. There are no bruises or abrasions noted. NEUROLOGICAL: Motor sensory exam is within normal limits. Reflexes are equal bilaterally. There is normal sensation to pinprick and light touch  MUSCULOSKELETAL:  Examination Left knee   Skin Intact   Range of motion    Effusion +   Medial joint line tenderness +   Lateral joint line tenderness +   Tenderness Pes Bursa -   Tenderness insertion MCL -   Tenderness insertion LCL -   Avinashs -   Patella crepitus +   Patella grind +   Lachman -   Pivot shift -   Anterior drawer -   Posterior drawer -   Varus stress -   Valgus stress -   Neurovascular Intact   Calf Swelling and Tenderness to Palpation -   Nat's Test -   Hamstring Cord Tightness -        IMAGING: XR of left knee with 2 views obtained in the office dated 3/09/2021 was reviewed and read by Dr. Lenore Alonso: Marked degenerative arthritis in all 3 compartments with varus alignment.           MRI of left knee dated 3/01/2021 was reviewed and read by Dr. Lenore Alonso:   IMPRESSION:  Complex, degenerative tear/maceration of the medial meniscus with extrusion. Broad areas of near full-thickness cartilage loss of the weightbearing medial compartment. Less severe lateral and patellofemoral compartment chondrosis. Mild joint effusion. Small Baker's cyst      IMPRESSION:      ICD-10-CM ICD-9-CM    1. Primary osteoarthritis of left knee  M17.12 715.16    2. Prepatellar bursitis of left knee  M70.42 726.65         PLAN:   1. Pt presents today with left knee pain due to primary OA and she notes overall improvement with the cortisone injection given at last OV. She can follow up as needed. Risk factors include: dm, htn, BMI>55  2. No ultrasound exam indicated today  3. No cortisone injection indicated today   4. No Physical/Occupational Therapy indicated today  5. No diagnostic test indicated today:   6.  No durable medical equipment indicated today  7. No referral indicated today   8. No medications indicated today:   9. No Narcotic indicated today       RTC prn      Scribed by [address]) as dictated by MD BECKIE Bejarano, Dr. Fidel Velasquez, confirm that all documentation is accurate.     Fidel Velasquez M.D.   [facility]

## 2021-06-07 ENCOUNTER — OFFICE VISIT (OUTPATIENT)
Dept: ORTHOPEDIC SURGERY | Age: 56
End: 2021-06-07
Payer: COMMERCIAL

## 2021-06-07 VITALS
HEART RATE: 83 BPM | HEIGHT: 58 IN | TEMPERATURE: 97.8 F | OXYGEN SATURATION: 97 % | BODY MASS INDEX: 58.78 KG/M2 | WEIGHT: 280 LBS

## 2021-06-07 DIAGNOSIS — S83.242D TEAR OF MEDIAL MENISCUS OF LEFT KNEE, CURRENT, UNSPECIFIED TEAR TYPE, SUBSEQUENT ENCOUNTER: ICD-10-CM

## 2021-06-07 DIAGNOSIS — M17.12 PRIMARY OSTEOARTHRITIS OF LEFT KNEE: Primary | ICD-10-CM

## 2021-06-07 PROCEDURE — 99213 OFFICE O/P EST LOW 20 MIN: CPT | Performed by: ORTHOPAEDIC SURGERY

## 2021-06-07 NOTE — PROGRESS NOTES
Lesly Gibson  1965   Chief Complaint   Patient presents with    Knee Pain     left        HISTORY OF PRESENT ILLNESS  Lesly Gibson is a 64 y.o. female who presents today for reevaluation of left knee. Patient rates pain as 7/10 today. Pain has been present since July 2020. No specific injury. Had cortisone injection by PCP in November/December which provided minimal relief. Has tried PT for 2 weeks but stopped going because she had an increase in pain, could not put pressure on the leg. Has tried taking Aleve and pain medication. The patient had a left knee cortisone injection on 3/09/2021 which only lasted a couple of months. Pt has been working on losing weight and has been doing pilates for around 1 month. Feels like there is a rubber band in her knee that wants to pop when getting up from a seated position. Knee feels tight. Has tried taking Celebrex previously. Patient denies any fever, chills, chest pain, shortness of breath or calf pain. The remainder of the review of systems is negative. There are no new illness or injuries to report since last seen in the office. There are no changes to medications, allergies, family or social history. Pain Assessment  6/7/2021   Location of Pain Knee   Location Modifiers Left   Severity of Pain 7   Quality of Pain Aching; Sharp   Quality of Pain Comment -   Duration of Pain Persistent   Frequency of Pain Constant   Aggravating Factors Standing;Walking;Stairs   Aggravating Factors Comment -   Limiting Behavior Yes   Relieving Factors Other (Comment)   Relieving Factors Comment ibprofen 800mg   Result of Injury No       PHYSICAL EXAM:   Visit Vitals  Pulse 83   Temp 97.8 °F (36.6 °C) (Skin)   Ht 4' 10\" (1.473 m)   Wt 280 lb (127 kg)   SpO2 97%   BMI 58.52 kg/m²     The patient is a well-developed, well-nourished female   in no acute distress. The patient is alert and oriented times three. The patient is alert and oriented times three.  Mood and affect are normal.  LYMPHATIC: lymph nodes are not enlarged and are within normal limits  SKIN: normal in color and non tender to palpation. There are no bruises or abrasions noted. NEUROLOGICAL: Motor sensory exam is within normal limits. Reflexes are equal bilaterally. There is normal sensation to pinprick and light touch  MUSCULOSKELETAL:  Examination Left knee   Skin Intact   Range of motion    Effusion +   Medial joint line tenderness +   Lateral joint line tenderness +   Tenderness Pes Bursa -   Tenderness insertion MCL -   Tenderness insertion LCL -   Avinashs -   Patella crepitus +   Patella grind +   Lachman -   Pivot shift -   Anterior drawer -   Posterior drawer -   Varus stress -   Valgus stress -   Neurovascular Intact   Calf Swelling and Tenderness to Palpation -   Nat's Test -   Hamstring Cord Tightness -       IMAGING: XR of left knee with 2 views obtained in the office dated 3/09/2021 was reviewed and read by Dr. Daniele Moore: Marked degenerative arthritis in all 3 compartments with varus alignment.           MRI of left knee dated 3/01/2021 was reviewed and read by Dr. Daniele Moore:   IMPRESSION:  Complex, degenerative tear/maceration of the medial meniscus with extrusion. Broad areas of near full-thickness cartilage loss of the weightbearing medial compartment. Less severe lateral and patellofemoral compartment chondrosis. Mild joint effusion. Small Baker's cyst      IMPRESSION:      ICD-10-CM ICD-9-CM    1. Primary osteoarthritis of left knee  M17.12 715.16 PROCEDURE AUTHORIZATION TO    2. Tear of medial meniscus of left knee, current, unspecified tear type, subsequent encounter  S83.242D V58.89      836.0         PLAN:   1. Pt presents today with left knee pain due to primary OA and the cortisone injection did not provide lasting relief. Will be proceeding with Euflexxa authorization for the left knee. I advised the patient on the importance of dramatic weight loss.    Risk factors include: dm, htn, BMI>55  2. No ultrasound exam indicated today  3. No cortisone injection indicated today   4. No Physical/Occupational Therapy indicated today  5. No diagnostic test indicated today:   6. No durable medical equipment indicated today  7. No referral indicated today   8. No medications indicated today:   9. No Narcotic indicated today       RTC following Euflexxa auth      Scribed by Ankita Delgado) as dictated by Leslye Patricia MD    I, Dr. Leslye Patricia, confirm that all documentation is accurate.     Leslye Patricia M.D.   [facility]

## 2021-07-09 ENCOUNTER — HOSPITAL ENCOUNTER (OUTPATIENT)
Dept: LAB | Age: 56
Discharge: HOME | End: 2021-07-09
Payer: COMMERCIAL

## 2021-07-09 ENCOUNTER — HOSPITAL ENCOUNTER (OUTPATIENT)
Dept: LAB | Age: 56
Discharge: HOME | End: 2021-07-09

## 2021-07-09 ENCOUNTER — TRANSCRIBE ORDER (OUTPATIENT)
Dept: REGISTRATION | Age: 56
End: 2021-07-09

## 2021-07-09 DIAGNOSIS — Z01.818 PRE-OP TESTING: ICD-10-CM

## 2021-07-09 DIAGNOSIS — Z01.818 PRE-OP TESTING: Primary | ICD-10-CM

## 2021-07-09 LAB
ATRIAL RATE: 68 BPM
CALCULATED P AXIS, ECG09: 38 DEGREES
CALCULATED R AXIS, ECG10: 6 DEGREES
CALCULATED T AXIS, ECG11: 14 DEGREES
DIAGNOSIS, 93000: NORMAL
P-R INTERVAL, ECG05: 158 MS
Q-T INTERVAL, ECG07: 402 MS
QRS DURATION, ECG06: 88 MS
QTC CALCULATION (BEZET), ECG08: 427 MS
VENTRICULAR RATE, ECG03: 68 BPM
XX-LABCORP SPECIMEN COL,LCBCF: NORMAL

## 2021-07-09 PROCEDURE — 99001 SPECIMEN HANDLING PT-LAB: CPT

## 2021-07-09 PROCEDURE — 93005 ELECTROCARDIOGRAM TRACING: CPT

## 2021-07-21 RX ORDER — PIOGLITAZONEHYDROCHLORIDE 45 MG/1
45 TABLET ORAL DAILY
COMMUNITY

## 2021-07-21 NOTE — PERIOP NOTES
PRE-SURGICAL INSTRUCTIONS        Patient's Name:  Cami Campuzano      Today's Date:  7/21/2021              Surgery Date:  7/29/2021                1. Do NOT eat or drink anything, including candy, gum, or ice chips after midnight on 7/28, unless you have specific instructions from your surgeon or anesthesia provider to do so.  2. You may brush your teeth before coming to the hospital.  3. No smoking 24 hours prior to the day of surgery. 4. No alcohol 24 hours prior to the day of surgery. 5. No recreational drugs for one week prior to the day of surgery. 6. Leave all valuables, including money/purse, at home. 7. Remove all jewelry, nail polish, acrylic nails, and makeup (including mascara); no lotions powders, deodorant, or perfume/cologne/after shave on the skin. 8. Glasses/contact lenses and dentures may be worn to the hospital.  They will be removed prior to surgery. 9. Call your doctor if symptoms of a cold or illness develop within 24-48 hours prior to your surgery. 10.  AN ADULT MUST DRIVE YOU HOME AFTER OUTPATIENT SURGERY. 11.  If you are having an outpatient procedure, please make arrangements for a responsible adult to be with you for 24 hours after your surgery. 12. One visitor may accompany you. Everyone must wear a mask and practice social distancing. Special Instructions:      Bring any pertinent legal medical records. Bring Covid card  Take these medications the morning of surgery with a sip of water:  AM Medications  Follow physician instructions about stopping anticoagulants. Stopped Celebrex 7/16         On the day of surgery, come in the main entrance of [facility]. Let the  at the desk know you are there for surgery. A staff member will come escort you to the surgical area on the second floor.     If you have any questions or concerns, please do not hesitate to call:     (Prior to the day of surgery) [facility] department:  [phone]   (Day of surgery) Pre-Op department:  [phone]    These surgical instructions were reviewed with Alana Windy during the PAT phone call.

## 2021-07-29 ENCOUNTER — ANESTHESIA (OUTPATIENT)
Dept: SURGERY | Age: 56
End: 2021-07-29
Payer: COMMERCIAL

## 2021-07-29 ENCOUNTER — HOSPITAL ENCOUNTER (OUTPATIENT)
Age: 56
Discharge: HOME | End: 2021-07-29
Attending: ORTHOPAEDIC SURGERY | Admitting: ORTHOPAEDIC SURGERY
Payer: COMMERCIAL

## 2021-07-29 ENCOUNTER — ANESTHESIA EVENT (OUTPATIENT)
Dept: SURGERY | Age: 56
End: 2021-07-29
Payer: COMMERCIAL

## 2021-07-29 VITALS
SYSTOLIC BLOOD PRESSURE: 146 MMHG | WEIGHT: 269 LBS | DIASTOLIC BLOOD PRESSURE: 71 MMHG | HEART RATE: 82 BPM | BODY MASS INDEX: 54.23 KG/M2 | HEIGHT: 59 IN | RESPIRATION RATE: 19 BRPM | TEMPERATURE: 98.5 F | OXYGEN SATURATION: 95 %

## 2021-07-29 DIAGNOSIS — S83.232D COMPLEX TEAR OF MEDIAL MENISCUS OF LEFT KNEE AS CURRENT INJURY, SUBSEQUENT ENCOUNTER: Primary | ICD-10-CM

## 2021-07-29 PROBLEM — S83.249A MEDIAL MENISCUS TEAR: Status: ACTIVE | Noted: 2021-07-29

## 2021-07-29 LAB
GLUCOSE BLD STRIP.AUTO-MCNC: 120 MG/DL (ref 70–110)
GLUCOSE BLD STRIP.AUTO-MCNC: 164 MG/DL (ref 70–110)

## 2021-07-29 PROCEDURE — 74011250636 HC RX REV CODE- 250/636: Performed by: NURSE ANESTHETIST, CERTIFIED REGISTERED

## 2021-07-29 PROCEDURE — 77030010509 HC AIRWY LMA MSK TELE -A: Performed by: ANESTHESIOLOGY

## 2021-07-29 PROCEDURE — 74011250637 HC RX REV CODE- 250/637: Performed by: ANESTHESIOLOGY

## 2021-07-29 PROCEDURE — 01400 ANES OPN/ARTHRS KNEE JT NOS: CPT | Performed by: ANESTHESIOLOGY

## 2021-07-29 PROCEDURE — 74011000250 HC RX REV CODE- 250: Performed by: ORTHOPAEDIC SURGERY

## 2021-07-29 PROCEDURE — 74011250636 HC RX REV CODE- 250/636: Performed by: ANESTHESIOLOGY

## 2021-07-29 PROCEDURE — 76210000016 HC OR PH I REC 1 TO 1.5 HR: Performed by: ORTHOPAEDIC SURGERY

## 2021-07-29 PROCEDURE — 88304 TISSUE EXAM BY PATHOLOGIST: CPT

## 2021-07-29 PROCEDURE — 76010000149 HC OR TIME 1 TO 1.5 HR: Performed by: ORTHOPAEDIC SURGERY

## 2021-07-29 PROCEDURE — 74011250636 HC RX REV CODE- 250/636: Performed by: ORTHOPAEDIC SURGERY

## 2021-07-29 PROCEDURE — 77030040361 HC SLV COMPR DVT MDII -B: Performed by: ORTHOPAEDIC SURGERY

## 2021-07-29 PROCEDURE — 74011000250 HC RX REV CODE- 250: Performed by: NURSE ANESTHETIST, CERTIFIED REGISTERED

## 2021-07-29 PROCEDURE — 01400 ANES OPN/ARTHRS KNEE JT NOS: CPT | Performed by: NURSE ANESTHETIST, CERTIFIED REGISTERED

## 2021-07-29 PROCEDURE — 82962 GLUCOSE BLOOD TEST: CPT

## 2021-07-29 PROCEDURE — 77030022036 HC BLD SHV TOMCAT STRY -B: Performed by: ORTHOPAEDIC SURGERY

## 2021-07-29 PROCEDURE — 77030013079 HC BLNKT BAIR HGGR 3M -A: Performed by: ANESTHESIOLOGY

## 2021-07-29 PROCEDURE — 76060000033 HC ANESTHESIA 1 TO 1.5 HR: Performed by: ORTHOPAEDIC SURGERY

## 2021-07-29 PROCEDURE — 2709999900 HC NON-CHARGEABLE SUPPLY: Performed by: ORTHOPAEDIC SURGERY

## 2021-07-29 PROCEDURE — 74011636637 HC RX REV CODE- 636/637: Performed by: ANESTHESIOLOGY

## 2021-07-29 PROCEDURE — 77030040922 HC BLNKT HYPOTHRM STRY -A: Performed by: ORTHOPAEDIC SURGERY

## 2021-07-29 PROCEDURE — 74011000272 HC RX REV CODE- 272: Performed by: ORTHOPAEDIC SURGERY

## 2021-07-29 PROCEDURE — 77030003444 HC NDL BIOP BN BD -A: Performed by: ORTHOPAEDIC SURGERY

## 2021-07-29 PROCEDURE — 76210000021 HC REC RM PH II 0.5 TO 1 HR: Performed by: ORTHOPAEDIC SURGERY

## 2021-07-29 PROCEDURE — 77030033005 HC TBNG ARTHSC PMP STRY -B: Performed by: ORTHOPAEDIC SURGERY

## 2021-07-29 RX ORDER — DEXTROSE 50 % IN WATER (D50W) INTRAVENOUS SYRINGE
25-50 AS NEEDED
Status: DISCONTINUED | OUTPATIENT
Start: 2021-07-29 | End: 2021-07-30 | Stop reason: HOSPADM

## 2021-07-29 RX ORDER — DEXTROSE 50 % IN WATER (D50W) INTRAVENOUS SYRINGE
25-50 AS NEEDED
Status: DISCONTINUED | OUTPATIENT
Start: 2021-07-29 | End: 2021-07-29 | Stop reason: HOSPADM

## 2021-07-29 RX ORDER — BUPIVACAINE HYDROCHLORIDE AND EPINEPHRINE 5; 5 MG/ML; UG/ML
INJECTION, SOLUTION EPIDURAL; INTRACAUDAL; PERINEURAL AS NEEDED
Status: DISCONTINUED | OUTPATIENT
Start: 2021-07-29 | End: 2021-07-29 | Stop reason: HOSPADM

## 2021-07-29 RX ORDER — SODIUM CHLORIDE 0.9 % (FLUSH) 0.9 %
5-40 SYRINGE (ML) INJECTION AS NEEDED
Status: DISCONTINUED | OUTPATIENT
Start: 2021-07-29 | End: 2021-07-29 | Stop reason: HOSPADM

## 2021-07-29 RX ORDER — PROPOFOL 10 MG/ML
INJECTION, EMULSION INTRAVENOUS AS NEEDED
Status: DISCONTINUED | OUTPATIENT
Start: 2021-07-29 | End: 2021-07-29 | Stop reason: HOSPADM

## 2021-07-29 RX ORDER — SODIUM CHLORIDE 0.9 % (FLUSH) 0.9 %
5-40 SYRINGE (ML) INJECTION EVERY 8 HOURS
Status: DISCONTINUED | OUTPATIENT
Start: 2021-07-29 | End: 2021-07-29 | Stop reason: HOSPADM

## 2021-07-29 RX ORDER — SODIUM CHLORIDE, SODIUM LACTATE, POTASSIUM CHLORIDE, CALCIUM CHLORIDE 600; 310; 30; 20 MG/100ML; MG/100ML; MG/100ML; MG/100ML
25 INJECTION, SOLUTION INTRAVENOUS CONTINUOUS
Status: DISCONTINUED | OUTPATIENT
Start: 2021-07-29 | End: 2021-07-30 | Stop reason: HOSPADM

## 2021-07-29 RX ORDER — LIDOCAINE HYDROCHLORIDE 20 MG/ML
INJECTION, SOLUTION EPIDURAL; INFILTRATION; INTRACAUDAL; PERINEURAL AS NEEDED
Status: DISCONTINUED | OUTPATIENT
Start: 2021-07-29 | End: 2021-07-29 | Stop reason: HOSPADM

## 2021-07-29 RX ORDER — ONDANSETRON 2 MG/ML
INJECTION INTRAMUSCULAR; INTRAVENOUS AS NEEDED
Status: DISCONTINUED | OUTPATIENT
Start: 2021-07-29 | End: 2021-07-29 | Stop reason: HOSPADM

## 2021-07-29 RX ORDER — FENTANYL CITRATE 50 UG/ML
INJECTION, SOLUTION INTRAMUSCULAR; INTRAVENOUS AS NEEDED
Status: DISCONTINUED | OUTPATIENT
Start: 2021-07-29 | End: 2021-07-29 | Stop reason: HOSPADM

## 2021-07-29 RX ORDER — MAGNESIUM SULFATE 100 %
4 CRYSTALS MISCELLANEOUS AS NEEDED
Status: DISCONTINUED | OUTPATIENT
Start: 2021-07-29 | End: 2021-07-29 | Stop reason: HOSPADM

## 2021-07-29 RX ORDER — INSULIN LISPRO 100 [IU]/ML
INJECTION, SOLUTION INTRAVENOUS; SUBCUTANEOUS ONCE
Status: DISCONTINUED | OUTPATIENT
Start: 2021-07-29 | End: 2021-07-30 | Stop reason: HOSPADM

## 2021-07-29 RX ORDER — SODIUM CHLORIDE, SODIUM LACTATE, POTASSIUM CHLORIDE, CALCIUM CHLORIDE 600; 310; 30; 20 MG/100ML; MG/100ML; MG/100ML; MG/100ML
125 INJECTION, SOLUTION INTRAVENOUS CONTINUOUS
Status: CANCELLED | OUTPATIENT
Start: 2021-07-29

## 2021-07-29 RX ORDER — MIDAZOLAM HYDROCHLORIDE 1 MG/ML
INJECTION, SOLUTION INTRAMUSCULAR; INTRAVENOUS AS NEEDED
Status: DISCONTINUED | OUTPATIENT
Start: 2021-07-29 | End: 2021-07-29 | Stop reason: HOSPADM

## 2021-07-29 RX ORDER — ONDANSETRON 2 MG/ML
4 INJECTION INTRAMUSCULAR; INTRAVENOUS ONCE
Status: COMPLETED | OUTPATIENT
Start: 2021-07-29 | End: 2021-07-29

## 2021-07-29 RX ORDER — HYDROMORPHONE HYDROCHLORIDE 2 MG/ML
0.5 INJECTION, SOLUTION INTRAMUSCULAR; INTRAVENOUS; SUBCUTANEOUS
Status: DISCONTINUED | OUTPATIENT
Start: 2021-07-29 | End: 2021-07-30 | Stop reason: HOSPADM

## 2021-07-29 RX ORDER — MAGNESIUM SULFATE 100 %
4 CRYSTALS MISCELLANEOUS AS NEEDED
Status: DISCONTINUED | OUTPATIENT
Start: 2021-07-29 | End: 2021-07-30 | Stop reason: HOSPADM

## 2021-07-29 RX ORDER — FAMOTIDINE 20 MG/1
20 TABLET, FILM COATED ORAL ONCE
Status: COMPLETED | OUTPATIENT
Start: 2021-07-29 | End: 2021-07-29

## 2021-07-29 RX ORDER — DEXAMETHASONE SODIUM PHOSPHATE 4 MG/ML
INJECTION, SOLUTION INTRA-ARTICULAR; INTRALESIONAL; INTRAMUSCULAR; INTRAVENOUS; SOFT TISSUE AS NEEDED
Status: DISCONTINUED | OUTPATIENT
Start: 2021-07-29 | End: 2021-07-29 | Stop reason: HOSPADM

## 2021-07-29 RX ORDER — SODIUM CHLORIDE, SODIUM LACTATE, POTASSIUM CHLORIDE, CALCIUM CHLORIDE 600; 310; 30; 20 MG/100ML; MG/100ML; MG/100ML; MG/100ML
25 INJECTION, SOLUTION INTRAVENOUS CONTINUOUS
Status: DISCONTINUED | OUTPATIENT
Start: 2021-07-29 | End: 2021-07-29 | Stop reason: HOSPADM

## 2021-07-29 RX ORDER — INSULIN LISPRO 100 [IU]/ML
INJECTION, SOLUTION INTRAVENOUS; SUBCUTANEOUS ONCE
Status: COMPLETED | OUTPATIENT
Start: 2021-07-29 | End: 2021-07-29

## 2021-07-29 RX ORDER — FENTANYL CITRATE 50 UG/ML
50 INJECTION, SOLUTION INTRAMUSCULAR; INTRAVENOUS AS NEEDED
Status: DISCONTINUED | OUTPATIENT
Start: 2021-07-29 | End: 2021-07-30 | Stop reason: HOSPADM

## 2021-07-29 RX ADMIN — PROPOFOL 200 MG: 10 INJECTION, EMULSION INTRAVENOUS at 15:08

## 2021-07-29 RX ADMIN — MIDAZOLAM HYDROCHLORIDE 2 MG: 2 INJECTION, SOLUTION INTRAMUSCULAR; INTRAVENOUS at 15:01

## 2021-07-29 RX ADMIN — FENTANYL CITRATE 25 MCG: 50 INJECTION, SOLUTION INTRAMUSCULAR; INTRAVENOUS at 15:34

## 2021-07-29 RX ADMIN — INSULIN LISPRO 3 UNITS: 100 INJECTION, SOLUTION INTRAVENOUS; SUBCUTANEOUS at 14:40

## 2021-07-29 RX ADMIN — FAMOTIDINE 20 MG: 20 TABLET ORAL at 14:40

## 2021-07-29 RX ADMIN — FENTANYL CITRATE 25 MCG: 50 INJECTION, SOLUTION INTRAMUSCULAR; INTRAVENOUS at 15:15

## 2021-07-29 RX ADMIN — ONDANSETRON 4 MG: 2 INJECTION INTRAMUSCULAR; INTRAVENOUS at 17:17

## 2021-07-29 RX ADMIN — DEXAMETHASONE SODIUM PHOSPHATE 4 MG: 4 INJECTION, SOLUTION INTRAMUSCULAR; INTRAVENOUS at 15:19

## 2021-07-29 RX ADMIN — ONDANSETRON 4 MG: 2 INJECTION INTRAMUSCULAR; INTRAVENOUS at 15:19

## 2021-07-29 RX ADMIN — WATER 3 G: 1 INJECTION INTRAMUSCULAR; INTRAVENOUS; SUBCUTANEOUS at 15:13

## 2021-07-29 RX ADMIN — LIDOCAINE HYDROCHLORIDE 100 MG: 20 INJECTION, SOLUTION EPIDURAL; INFILTRATION; INTRACAUDAL; PERINEURAL at 15:08

## 2021-07-29 RX ADMIN — FENTANYL CITRATE 75 MCG: 50 INJECTION, SOLUTION INTRAMUSCULAR; INTRAVENOUS at 16:12

## 2021-07-29 RX ADMIN — SODIUM CHLORIDE, SODIUM LACTATE, POTASSIUM CHLORIDE, AND CALCIUM CHLORIDE 25 ML/HR: 600; 310; 30; 20 INJECTION, SOLUTION INTRAVENOUS at 14:39

## 2021-07-29 RX ADMIN — FENTANYL CITRATE 25 MCG: 50 INJECTION, SOLUTION INTRAMUSCULAR; INTRAVENOUS at 15:12

## 2021-07-29 RX ADMIN — FENTANYL CITRATE 50 MCG: 50 INJECTION, SOLUTION INTRAMUSCULAR; INTRAVENOUS at 15:18

## 2021-07-29 RX ADMIN — HYDROMORPHONE HYDROCHLORIDE 0.5 MG: 2 INJECTION, SOLUTION INTRAMUSCULAR; INTRAVENOUS; SUBCUTANEOUS at 17:15

## 2021-07-29 NOTE — ANESTHESIA POSTPROCEDURE EVALUATION
Procedure(s):  LEFT KNEE ARTHROSCOPIC MEDIAL MENISCECTOMY AND LET KNEE OPEN EXCISION OF lipoma  . general    Anesthesia Post Evaluation      Multimodal analgesia: multimodal analgesia used between 6 hours prior to anesthesia start to PACU discharge  Patient location during evaluation: PACU  Patient participation: complete - patient participated  Level of consciousness: awake and alert  Pain management: adequate  Airway patency: patent  Anesthetic complications: no  Cardiovascular status: acceptable  Respiratory status: acceptable  Hydration status: acceptable  Post anesthesia nausea and vomiting:  none  Final Post Anesthesia Temperature Assessment:  Normothermia (36.0-37.5 degrees C)      INITIAL Post-op Vital signs:   Vitals Value Taken Time   /73 07/29/21 1730   Temp 36.9 °C (98.5 °F) 07/29/21 1610   Pulse 78 07/29/21 1736   Resp 14 07/29/21 1736   SpO2 95 % 07/29/21 1736   Vitals shown include unvalidated device data.

## 2021-07-29 NOTE — DISCHARGE INSTRUCTIONS
Patient Education        Knee Arthroscopy: What to Expect at Home  Your Recovery     Arthroscopy is a way to find problems and do surgery inside a joint without making a large cut (incision). Your doctor put a lighted tube with a tiny camera--called an arthroscope, or scope--and surgical tools through small incisions in your knee. You will feel tired for several days. Your knee will be swollen. And you may notice that your skin is a different color near the cuts (incisions). The swelling is normal and will start to go away in a few days. Keeping your leg higher than your heart will help with swelling and pain. You will probably need about 6 weeks to recover. If your doctor repaired damaged tissue, recovery will take longer. You may have to limit your activity until your knee strength and movement are back to normal. You may also be in a physical rehabilitation (rehab) program.  You may be able to return to a desk job or your normal routine in a few days. But if you do physical labor, it may be as long as 2 months before you can go back to work. This care sheet gives you a general idea about how long it will take for you to recover. But each person recovers at a different pace. Follow the steps below to get better as quickly as possible. How can you care for yourself at home? Activity    · Rest when you feel tired. Getting enough sleep will help you recover. Use pillows to raise your ankle and leg above the level of your heart.     · Try to walk each day, after your doctor has said you can. Start by walking a little more than you did the day before. Bit by bit, increase the amount you walk. Walking boosts blood flow and helps prevent pneumonia and constipation.     · You may have a brace or crutches or both.     · Your doctor will tell you how often and how much you can move your leg and knee.     · If you have a desk job, you may be able to return to work a few days after the surgery.  If you lift things or stand or walk a lot at work, it may be as long as 2 months before you can return.     · You can take a shower 48 to 72 hours after surgery and clean the incisions with regular soap and water. Do not take a bath or soak your knee until your doctor says it is okay.     · Ask your doctor when you can drive again.     · If you had a repair of torn tissue, follow your doctor's instructions for lifting things or moving your knee. Diet    · You can eat your normal diet. If your stomach is upset, try bland, low-fat foods like plain rice, broiled chicken, toast, and yogurt.     · Drink plenty of fluids, unless your doctor tells you not to.     · You may notice that your bowel movements are not regular right after your surgery. This is common. Try to avoid constipation and straining with bowel movements. You may want to take a fiber supplement every day. If you have not had a bowel movement after a couple of days, ask your doctor about taking a mild laxative. Medicines    · Your doctor will tell you if and when you can restart your medicines. He or she will also give you instructions about taking any new medicines.     · If you take aspirin or some other blood thinner, ask your doctor if and when to start taking it again. Make sure that you understand exactly what your doctor wants you to do.     · Be safe with medicines. Take pain medicines exactly as directed. ? If the doctor gave you a prescription medicine for pain, take it as prescribed. ? If you are not taking a prescription pain medicine, ask your doctor if you can take an over-the-counter medicine.     · If you think your pain medicine is making you sick to your stomach:  ? Take your medicine after meals (unless your doctor has told you not to). ? Ask your doctor for a different pain medicine.     · If your doctor prescribed antibiotics, take them as directed. Do not stop taking them just because you feel better. You need to take the full course of antibiotics. Incision care    · If you have a dressing over your cuts (incisions), keep it clean and dry. You may remove it 48 to 72 hours after the surgery.     · If your incisions are open to the air, keep the area clean and dry.     · If you have strips of tape on the incisions, leave the tape on for a week or until it falls off. Exercise    · Move your toes and ankle as much as your bandages will allow.     · Bend and straighten your knee slowly several times during the day.     · Depending on why you had the surgery, you may have to do ankle and leg exercises. Your doctor or physical therapist will give you exercises as part of a rehabilitation program.     · Stop any activity that causes sharp pain. Talk to your doctor or physical therapist about what sports or other exercise you can do. Ice    · To reduce swelling and pain, put ice or a cold pack on your knee for 10 to 20 minutes at a time. Do this every 1 to 2 hours. Put a thin cloth between the ice and your skin. Follow-up care is a key part of your treatment and safety. Be sure to make and go to all appointments, and call your doctor if you are having problems. It's also a good idea to know your test results and keep a list of the medicines you take. When should you call for help? Call 911 anytime you think you may need emergency care. For example, call if:    · You passed out (lost consciousness).     · You have severe trouble breathing.     · You have sudden chest pain and shortness of breath, or you cough up blood. Call your doctor now or seek immediate medical care if:    · Your foot or toes are numb or tingling.     · Your foot is cool or pale, or it changes color.     · You have signs of a blood clot, such as:  ? Pain in your calf, back of the knee, thigh, or groin. ?  Redness and swelling in your leg or groin.     · You are sick to your stomach or cannot keep fluids down.     · You have pain that does not get better after you take pain medicine.     · You have loose stitches, or your incision comes open.     · Bright red blood has soaked through the bandage over your incision.     · You have signs of infection, such as:  ? Increased pain, swelling, warmth, or redness. ? Red streaks leading from the incisions. ? Pus draining from the incisions. ? A fever. Watch closely for any changes in your health, and be sure to contact your doctor if:    · You do not have a bowel movement after taking a laxative. Where can you learn more? Go to http://www.Tern/  Enter U405 in the search box to learn more about \"Knee Arthroscopy: What to Expect at Home. \"  Current as of: November 16, 2020               Content Version: 12.8  © 2006-2021 [employer]. Care instructions adapted under license by [employer] (which disclaims liability or warranty for this information). If you have questions about a medical condition or this instruction, always ask your healthcare professional. [place] [address] any warranty or liability for your use of this information. Patient Education      Ambulate with weight bearing as tolerated. Learning About How to Use Crutches  Overview  Crutches can help you walk when you have an injured hip, leg, knee, ankle, or foot. Your doctor will tell you how much weight--if any--you can put on your leg. Be sure your crutches fit you. When you stand up in your normal posture, there should be space for two or three fingers between the top of the crutch and your armpit. When you let your hands hang down, the hand  should be at your wrists. When you put your hands on the hand , your elbows should be slightly bent. To stay safe when using crutches:  Look straight ahead, not down at your feet. Clear away small rugs, cords, or anything else that could cause you to trip, slip, or fall. Be very careful around pets and small children.  They can get in your path when you least expect it. Be sure the rubber tips on your crutches are clean and in good condition to help prevent slipping. Avoid slick conditions, such as wet floors and snowy or icy driveways. In bad weather, be extra careful on curbs and steps. How to use crutches  Getting ready to walk   [facility] your elbows slightly. Press the padded top parts of the crutches against your sides, under your armpits. If you have been told not to put any weight on your injured leg, keep that leg bent and off the ground. How to walk with crutches when you can put weight on the injured leg   Crutches allow you to take all the weight off of one leg. They can also be used as an added support if you have some injury or condition of both legs. Here's how to walk with crutches when you're able to put weight on your weak or injured leg. Be sure your crutches fit you. When you stand up in your normal posture, there should be space for two or three fingers between the top of the crutch and your underarm. When you let your hands hang down, the hand  should be at your wrists. When you put your hands on the hand , your elbows should be slightly bent. Put both crutches about 12 inches in front of you. Put your weight on the handgrips, not on the pads under your arms. Move your weak or injured leg forward so it's almost even with the crutches. Bring your good leg up, so it's even with your weak or injured leg. Move your crutches about 12 inches in front of you, and start the next step. The crutches and your feet should form a triangle. Hold the crutches close enough to your body so you can push straight down on them, but leave room between the crutches for your body to pass through. Don't lean forward to reach farther. Constant pressure against your underarms can cause numbness. When you're confident using the crutches, you can move the crutches and your injured leg at the same time.  Then push straight down on the crutches as you step past the crutches with your strong leg, as you would in normal walking. Take small steps. Use ramps and elevators when you can. Sitting down   To sit, back up to the chair. Use one hand to hold both crutches by the handgrips, beside your injured leg. With the other hand, hold onto the seat and slowly lower yourself onto the chair. Lay the crutches on the ground near your chair. If you prop them up, they may fall over. Getting up from a chair   To get up from a chair,  the crutches and put them in one hand beside your injured leg. Put your weight on the handgrips of the crutches and on your strong leg to stand up. How to go up and down stairs using crutches   Here's how to go up and down stairs using crutches. Try this first with another person nearby to steady you if needed. Stand near the edge of the stairs. Go up or down the stairs. If you are going up, step up with your stronger leg. Then bring the crutches and your weak or injured leg to the upper step. If you are going down, put your crutches and your weak or injured leg on the lower step. Then bring your stronger leg down to the lower step. Remember \"up with the good, and down with the bad\" to help you lead with the correct leg. Crutches: How to go up and down stairs with handrails   If the stairs have a good sturdy handrail, you can hold it with one hand and your crutches together in the other hand. Here's how. Try this first with another person nearby to steady you if needed. If the stairs have a handrail but you don't think it's sturdy enough, use the crutches normally, holding one in each hand. Stand near the edge of the stairs. Put both crutches under the arm opposite the handrail. Use the hand opposite the handrail to hold both crutches by the handgrips. Hold onto the handrail as you go up or down. Go up or down the stairs. If you are going up, step up with your stronger leg.  Then bring the crutches and your weak or injured leg to the upper step. If you are going down, put your crutches and your weak or injured leg on the lower step. Then bring your stronger leg down to the lower step. Remember \"up with the good, down with the bad\" to help you lead with the correct leg. Follow-up care is a key part of your treatment and safety. Be sure to make and go to all appointments, and call your doctor if you are having problems. It's also a good idea to know your test results and keep a list of the medicines you take. Where can you learn more? Go to http://www.gray.com/  Enter G273 in the search box to learn more about \"Learning About How to Use Crutches. \"  Current as of: November 16, 2020               Content Version: 12.8  © 2006-2021 [employer]. Care instructions adapted under license by [employer] (which disclaims liability or warranty for this information). If you have questions about a medical condition or this instruction, always ask your healthcare professional. [place] [address] any warranty or liability for your use of this information. DISCHARGE SUMMARY from Nurse    PATIENT INSTRUCTIONS:    After general anesthesia or intravenous sedation, for 24 hours or while taking prescription Narcotics:  · Limit your activities  · Do not drive and operate hazardous machinery  · Do not make important personal or business decisions  · Do  not drink alcoholic beverages  · If you have not urinated within 8 hours after discharge, please contact your surgeon on call.     Report the following to your surgeon:  · Excessive pain, swelling, redness or odor of or around the surgical area  · Temperature over 100.5  · Nausea and vomiting lasting longer than 4 hours or if unable to take medications  · Any signs of decreased circulation or nerve impairment to extremity: change in color, persistent  numbness, tingling, coldness or increase pain  · Any questions    What to do at Home:  Recommended activity: {discharge activity:19737}, ***    If you experience any of the following symptoms ***, please follow up with ***. *  Please give a list of your current medications to your Primary Care Provider. *  Please update this list whenever your medications are discontinued, doses are      changed, or new medications (including over-the-counter products) are added. *  Please carry medication information at all times in case of emergency situations. These are general instructions for a healthy lifestyle:    No smoking/ No tobacco products/ Avoid exposure to second hand smoke  Surgeon General's Warning:  Quitting smoking now greatly reduces serious risk to your health. Obesity, smoking, and sedentary lifestyle greatly increases your risk for illness    A healthy diet, regular physical exercise & weight monitoring are important for maintaining a healthy lifestyle    You may be retaining fluid if you have a history of heart failure or if you experience any of the following symptoms:  Weight gain of 3 pounds or more overnight or 5 pounds in a week, increased swelling in our hands or feet or shortness of breath while lying flat in bed. Please call your doctor as soon as you notice any of these symptoms; do not wait until your next office visit. The discharge information has been reviewed with the {PATIENT PARENT GUARDIAN:01298}. The {PATIENT PARENT GUARDIAN:02779} verbalized understanding. Discharge medications reviewed with the {Tamiko meds reviewed [facility]:63322} and appropriate educational materials and side effects teaching were provided.   ___________________________________________________________________________________________________________________________________

## 2021-07-29 NOTE — ANESTHESIA PREPROCEDURE EVALUATION
Relevant Problems   CARDIOVASCULAR   (+) HTN (hypertension)      GASTROINTESTINAL   (+) Esophageal reflux       Anesthetic History   No history of anesthetic complications            Review of Systems / Medical History  Patient summary reviewed, nursing notes reviewed and pertinent labs reviewed    Pulmonary  Within defined limits                 Neuro/Psych              Cardiovascular    Hypertension: well controlled                   GI/Hepatic/Renal     GERD: well controlled           Endo/Other    Diabetes: well controlled, type 2    Morbid obesity and arthritis     Other Findings              Physical Exam    Airway  Mallampati: II  TM Distance: 4 - 6 cm  Neck ROM: normal range of motion   Mouth opening: Normal     Cardiovascular    Rhythm: regular  Rate: normal         Dental  No notable dental hx       Pulmonary  Breath sounds clear to auscultation               Abdominal  GI exam deferred       Other Findings            Anesthetic Plan    ASA: 3  Anesthesia type: general          Induction: Intravenous  Anesthetic plan and risks discussed with: Patient

## 2021-07-30 NOTE — OP NOTES
[address] [place] [address]   OPERATIVE REPORT    Name:  Michelle Gonzalez  MR#:   [id number]  :  1965  ACCOUNT #:  [de-identified]  DATE OF SERVICE:  2021    PREOPERATIVE DIAGNOSES:  1. Medial meniscus tear of the left knee. 2.  Lipoma, left knee. POSTOPERATIVE DIAGNOSES:  1. Medial meniscus tear of the left knee. 2.  Lipoma, left knee. 3.  Osteoarthritis, left knee. 4.  Synovitis, left knee. PROCEDURES PERFORMED:  1. Arthroscopy. 2.  Arthroscopic synovectomy. 3.  Arthroscopic medial meniscectomy. 4.  Open excision of lipoma. 5.  Bone marrow aspiration and injection. SURGEON:  Pankaj Issa MD.    Angelo Zavala    ANESTHESIA:  general.    COMPLICATIONS:  0.    SPECIMENS REMOVED:  0.    IMPLANTS:  0.    ESTIMATED BLOOD LOSS:  10cc. SUMMARY OF PROCEDURE:  After general anesthesia was induced, the patient's left knee was examined, which showed a mild effusion, full motion and full stability. An arthroscopy was performed through an anterolateral approach. The suprapatellar pouch had proliferative synovitis. The patellofemoral joint was congruous, but there was significant defibrillation, but no flap tearing or instability. There was a complex medial meniscus tear associated with exposed subchondral bone over a posteromedial crescent of the tibia and over an engaging area of the medial femoral condyle. The anterior cruciate ligament was normal.  The lateral compartment was normal.  Attention was returned to the medial compartment where a subtotal medial meniscectomy was performed. A subtotal synovectomy was performed with a motorized shaver in the suprapatellar pouch, intercondylar notch, and medial and lateral gutters. The arthroscopy was then terminated. An anteromedial incision was made over the large anteromedial mass and extended through subcutaneous tissue. A lemon-sized lipoma was removed. There was no significant capsule of the lipoma.   The lipoma was sent for pathologic analysis. Hemostasis was achieved with electrocautery. The wound was closed in layers. 60 mL of bone marrow was aspirated from the proximal tibia and injected into the knee joint.         Padmaja Wisdom MD      [id number]  D:  07/29/2021 16:09  T:  07/30/2021 2:12  JOB #:  [id number]

## 2021-12-10 ENCOUNTER — TRANSCRIBE ORDER (OUTPATIENT)
Dept: SCHEDULING | Age: 56
End: 2021-12-10

## 2021-12-10 DIAGNOSIS — R22.42 MASS OF LEFT KNEE: Primary | ICD-10-CM

## 2021-12-10 DIAGNOSIS — S83.242D OTHER TEAR OF MEDIAL MENISCUS OF LEFT KNEE AS CURRENT INJURY, SUBSEQUENT ENCOUNTER: ICD-10-CM

## 2021-12-20 ENCOUNTER — HOSPITAL ENCOUNTER (OUTPATIENT)
Age: 56
Discharge: HOME | End: 2021-12-20
Attending: ORTHOPAEDIC SURGERY
Payer: COMMERCIAL

## 2021-12-20 DIAGNOSIS — R22.42 MASS OF LEFT KNEE: ICD-10-CM

## 2021-12-20 DIAGNOSIS — S83.242D OTHER TEAR OF MEDIAL MENISCUS OF LEFT KNEE AS CURRENT INJURY, SUBSEQUENT ENCOUNTER: ICD-10-CM

## 2021-12-20 PROCEDURE — 73721 MRI JNT OF LWR EXTRE W/O DYE: CPT

## 2022-03-19 PROBLEM — S83.249A MEDIAL MENISCUS TEAR: Status: ACTIVE | Noted: 2021-07-29

## 2024-07-18 ENCOUNTER — HOSPITAL ENCOUNTER (EMERGENCY)
Facility: HOSPITAL | Age: 59
Discharge: HOME | End: 2024-07-19
Attending: EMERGENCY MEDICINE
Payer: COMMERCIAL

## 2024-07-18 ENCOUNTER — APPOINTMENT (OUTPATIENT)
Facility: HOSPITAL | Age: 59
End: 2024-07-18
Payer: COMMERCIAL

## 2024-07-18 DIAGNOSIS — E87.6 HYPOKALEMIA: Primary | ICD-10-CM

## 2024-07-18 DIAGNOSIS — E86.0 DEHYDRATION: ICD-10-CM

## 2024-07-18 DIAGNOSIS — E11.65 HYPERGLYCEMIA DUE TO DIABETES MELLITUS (HCC): ICD-10-CM

## 2024-07-18 LAB
ALBUMIN SERPL-MCNC: 4.4 G/DL (ref 3.4–5)
ALBUMIN/GLOB SERPL: 1 (ref 0.8–1.7)
ALP SERPL-CCNC: 124 U/L (ref 45–117)
ALT SERPL-CCNC: 19 U/L (ref 13–56)
ANION GAP SERPL CALC-SCNC: 17 MMOL/L (ref 3–18)
AST SERPL-CCNC: 14 U/L (ref 10–38)
BASOPHILS # BLD: 0.1 K/UL (ref 0–0.1)
BASOPHILS NFR BLD: 1 % (ref 0–2)
BILIRUB SERPL-MCNC: 1 MG/DL (ref 0.2–1)
BUN SERPL-MCNC: 36 MG/DL (ref 7–18)
BUN/CREAT SERPL: 23 (ref 12–20)
CALCIUM SERPL-MCNC: 11 MG/DL (ref 8.5–10.1)
CHLORIDE SERPL-SCNC: 90 MMOL/L (ref 100–111)
CO2 SERPL-SCNC: 25 MMOL/L (ref 21–32)
CREAT SERPL-MCNC: 1.6 MG/DL (ref 0.6–1.3)
DIFFERENTIAL METHOD BLD: ABNORMAL
EOSINOPHIL # BLD: 0.1 K/UL (ref 0–0.4)
EOSINOPHIL NFR BLD: 1 % (ref 0–5)
ERYTHROCYTE [DISTWIDTH] IN BLOOD BY AUTOMATED COUNT: 17.2 % (ref 11.6–14.5)
GLOBULIN SER CALC-MCNC: 4.2 G/DL (ref 2–4)
GLUCOSE BLD STRIP.AUTO-MCNC: 278 MG/DL (ref 70–110)
GLUCOSE SERPL-MCNC: 310 MG/DL (ref 74–99)
HCT VFR BLD AUTO: 44.4 % (ref 35–45)
HGB BLD-MCNC: 14.2 G/DL (ref 12–16)
IMM GRANULOCYTES # BLD AUTO: 0 K/UL (ref 0–0.04)
IMM GRANULOCYTES NFR BLD AUTO: 0 % (ref 0–0.5)
LYMPHOCYTES # BLD: 2.3 K/UL (ref 0.9–3.6)
LYMPHOCYTES NFR BLD: 21 % (ref 21–52)
MCH RBC QN AUTO: 21.2 PG (ref 24–34)
MCHC RBC AUTO-ENTMCNC: 32 G/DL (ref 31–37)
MCV RBC AUTO: 66.2 FL (ref 78–100)
MONOCYTES # BLD: 1 K/UL (ref 0.05–1.2)
MONOCYTES NFR BLD: 9 % (ref 3–10)
NEUTS SEG # BLD: 7.5 K/UL (ref 1.8–8)
NEUTS SEG NFR BLD: 69 % (ref 40–73)
NRBC # BLD: 0 K/UL (ref 0–0.01)
NRBC BLD-RTO: 0 PER 100 WBC
PLATELET # BLD AUTO: 587 K/UL (ref 135–420)
PMV BLD AUTO: 9.7 FL (ref 9.2–11.8)
POTASSIUM SERPL-SCNC: 3.9 MMOL/L (ref 3.5–5.5)
PROT SERPL-MCNC: 8.6 G/DL (ref 6.4–8.2)
RBC # BLD AUTO: 6.71 M/UL (ref 4.2–5.3)
SODIUM SERPL-SCNC: 132 MMOL/L (ref 136–145)
TROPONIN I SERPL HS-MCNC: 18 NG/L (ref 0–54)
WBC # BLD AUTO: 10.9 K/UL (ref 4.6–13.2)

## 2024-07-18 PROCEDURE — 96375 TX/PRO/DX INJ NEW DRUG ADDON: CPT

## 2024-07-18 PROCEDURE — 85025 COMPLETE CBC W/AUTO DIFF WBC: CPT

## 2024-07-18 PROCEDURE — 71045 X-RAY EXAM CHEST 1 VIEW: CPT

## 2024-07-18 PROCEDURE — 2580000003 HC RX 258: Performed by: EMERGENCY MEDICINE

## 2024-07-18 PROCEDURE — 87804 INFLUENZA ASSAY W/OPTIC: CPT

## 2024-07-18 PROCEDURE — 96374 THER/PROPH/DIAG INJ IV PUSH: CPT

## 2024-07-18 PROCEDURE — 87635 SARS-COV-2 COVID-19 AMP PRB: CPT

## 2024-07-18 PROCEDURE — 82962 GLUCOSE BLOOD TEST: CPT

## 2024-07-18 PROCEDURE — 99285 EMERGENCY DEPT VISIT HI MDM: CPT

## 2024-07-18 PROCEDURE — 84484 ASSAY OF TROPONIN QUANT: CPT

## 2024-07-18 PROCEDURE — 6360000002 HC RX W HCPCS: Performed by: EMERGENCY MEDICINE

## 2024-07-18 PROCEDURE — 96361 HYDRATE IV INFUSION ADD-ON: CPT

## 2024-07-18 PROCEDURE — 93005 ELECTROCARDIOGRAM TRACING: CPT | Performed by: EMERGENCY MEDICINE

## 2024-07-18 PROCEDURE — 80053 COMPREHEN METABOLIC PANEL: CPT

## 2024-07-18 RX ORDER — 0.9 % SODIUM CHLORIDE 0.9 %
1000 INTRAVENOUS SOLUTION INTRAVENOUS ONCE
Status: COMPLETED | OUTPATIENT
Start: 2024-07-18 | End: 2024-07-19

## 2024-07-18 RX ORDER — ONDANSETRON 2 MG/ML
4 INJECTION INTRAMUSCULAR; INTRAVENOUS
Status: COMPLETED | OUTPATIENT
Start: 2024-07-18 | End: 2024-07-18

## 2024-07-18 RX ORDER — MORPHINE SULFATE 4 MG/ML
4 INJECTION, SOLUTION INTRAMUSCULAR; INTRAVENOUS
Status: COMPLETED | OUTPATIENT
Start: 2024-07-19 | End: 2024-07-18

## 2024-07-18 RX ADMIN — MORPHINE SULFATE 4 MG: 4 INJECTION, SOLUTION INTRAMUSCULAR; INTRAVENOUS at 23:46

## 2024-07-18 RX ADMIN — ONDANSETRON 4 MG: 2 INJECTION INTRAMUSCULAR; INTRAVENOUS at 23:46

## 2024-07-18 RX ADMIN — SODIUM CHLORIDE 1000 ML: 9 INJECTION, SOLUTION INTRAVENOUS at 23:46

## 2024-07-18 ASSESSMENT — ENCOUNTER SYMPTOMS
RESPIRATORY NEGATIVE: 1
GASTROINTESTINAL NEGATIVE: 1

## 2024-07-18 ASSESSMENT — PAIN DESCRIPTION - LOCATION
LOCATION: ABDOMEN;KNEE
LOCATION: CHEST

## 2024-07-18 ASSESSMENT — PAIN - FUNCTIONAL ASSESSMENT: PAIN_FUNCTIONAL_ASSESSMENT: 0-10

## 2024-07-18 ASSESSMENT — LIFESTYLE VARIABLES
HOW MANY STANDARD DRINKS CONTAINING ALCOHOL DO YOU HAVE ON A TYPICAL DAY: PATIENT DOES NOT DRINK
HOW OFTEN DO YOU HAVE A DRINK CONTAINING ALCOHOL: NEVER

## 2024-07-18 ASSESSMENT — PAIN DESCRIPTION - DESCRIPTORS
DESCRIPTORS: ACHING
DESCRIPTORS: PRESSURE

## 2024-07-18 ASSESSMENT — PAIN SCALES - GENERAL
PAINLEVEL_OUTOF10: 5
PAINLEVEL_OUTOF10: 5

## 2024-07-18 ASSESSMENT — PAIN DESCRIPTION - ORIENTATION
ORIENTATION: LEFT
ORIENTATION: MID

## 2024-07-19 VITALS
OXYGEN SATURATION: 97 % | BODY MASS INDEX: 39.88 KG/M2 | DIASTOLIC BLOOD PRESSURE: 85 MMHG | TEMPERATURE: 98.6 F | HEART RATE: 92 BPM | WEIGHT: 190 LBS | SYSTOLIC BLOOD PRESSURE: 136 MMHG | HEIGHT: 58 IN | RESPIRATION RATE: 18 BRPM

## 2024-07-19 LAB
ALBUMIN SERPL-MCNC: 2.8 G/DL (ref 3.4–5)
ALBUMIN/GLOB SERPL: 0.9 (ref 0.8–1.7)
ALP SERPL-CCNC: 79 U/L (ref 45–117)
ALT SERPL-CCNC: 16 U/L (ref 13–56)
ANION GAP SERPL CALC-SCNC: 8 MMOL/L (ref 3–18)
AST SERPL-CCNC: 10 U/L (ref 10–38)
BILIRUB SERPL-MCNC: 0.6 MG/DL (ref 0.2–1)
BUN SERPL-MCNC: 30 MG/DL (ref 7–18)
BUN/CREAT SERPL: 30 (ref 12–20)
CALCIUM SERPL-MCNC: 7.3 MG/DL (ref 8.5–10.1)
CHLORIDE SERPL-SCNC: 106 MMOL/L (ref 100–111)
CO2 SERPL-SCNC: 28 MMOL/L (ref 21–32)
CREAT SERPL-MCNC: 0.99 MG/DL (ref 0.6–1.3)
EKG ATRIAL RATE: 110 BPM
EKG DIAGNOSIS: NORMAL
EKG P AXIS: 48 DEGREES
EKG P-R INTERVAL: 140 MS
EKG Q-T INTERVAL: 324 MS
EKG QRS DURATION: 78 MS
EKG QTC CALCULATION (BAZETT): 438 MS
EKG R AXIS: 8 DEGREES
EKG T AXIS: 74 DEGREES
EKG VENTRICULAR RATE: 110 BPM
FLUAV AG NPH QL IA: NEGATIVE
FLUBV AG NOSE QL IA: NEGATIVE
GLOBULIN SER CALC-MCNC: 3.1 G/DL (ref 2–4)
GLUCOSE BLD STRIP.AUTO-MCNC: 165 MG/DL (ref 70–110)
GLUCOSE SERPL-MCNC: 149 MG/DL (ref 74–99)
POTASSIUM SERPL-SCNC: 3 MMOL/L (ref 3.5–5.5)
PROT SERPL-MCNC: 5.9 G/DL (ref 6.4–8.2)
SARS-COV-2 RDRP RESP QL NAA+PROBE: NOT DETECTED
SODIUM SERPL-SCNC: 142 MMOL/L (ref 136–145)
SOURCE: NORMAL
TROPONIN I SERPL HS-MCNC: 22 NG/L (ref 0–54)

## 2024-07-19 PROCEDURE — 82962 GLUCOSE BLOOD TEST: CPT

## 2024-07-19 PROCEDURE — 80053 COMPREHEN METABOLIC PANEL: CPT

## 2024-07-19 PROCEDURE — 93010 ELECTROCARDIOGRAM REPORT: CPT | Performed by: INTERNAL MEDICINE

## 2024-07-19 PROCEDURE — 84484 ASSAY OF TROPONIN QUANT: CPT

## 2024-07-19 RX ORDER — POTASSIUM CHLORIDE 20 MEQ/1
20 TABLET, EXTENDED RELEASE ORAL 2 TIMES DAILY
Qty: 10 TABLET | Refills: 1 | Status: SHIPPED | OUTPATIENT
Start: 2024-07-19

## 2024-07-19 RX ORDER — ONDANSETRON 4 MG/1
4 TABLET, FILM COATED ORAL 3 TIMES DAILY PRN
Qty: 15 TABLET | Refills: 0 | Status: SHIPPED | OUTPATIENT
Start: 2024-07-19

## 2024-07-19 RX ORDER — DICYCLOMINE HCL 20 MG
20 TABLET ORAL 4 TIMES DAILY
Qty: 20 TABLET | Refills: 0 | Status: SHIPPED | OUTPATIENT
Start: 2024-07-19

## 2024-07-19 NOTE — ED NOTES
Placed pt on 2L nasal cannula due to oxygen saturation of 75% while sleeping. Pt not at 99%, informed primary nurse, will continue to monitor.

## 2024-07-19 NOTE — ED NOTES
At bedside pt resting eyes closed respirations even and unlabored at this time. Cardiac monitor in place, VSS , NAD noted at this time. 2L NC post morphine .Voiced no issues or concerns at this time.

## 2024-07-19 NOTE — ED NOTES
Pt to ED reports nausea, vomiting, CP and SOB onset yesterday worse today . Hx of DM, HTN Pt placed on cardiac monitor blanket provided for comfort, family at bedside. Recent knee surgery left knee. Pain denies pain at this time

## 2024-07-19 NOTE — ED PROVIDER NOTES
48 degrees    R Axis 8 degrees    T Axis 74 degrees    Diagnosis       Sinus tachycardia  Anterior infarct , age undetermined  Abnormal ECG  When compared with ECG of 09-JUL-2021 12:50,  Vent. rate has increased BY  42 BPM  Non-specific change in ST segment in Lateral leads  Nonspecific T wave abnormality no longer evident in Anterior leads  Nonspecific T wave abnormality now evident in Lateral leads     Troponin    Collection Time: 07/18/24 10:23 PM   Result Value Ref Range    Troponin, High Sensitivity 18 0 - 54 ng/L   CBC with Auto Differential    Collection Time: 07/18/24 10:23 PM   Result Value Ref Range    WBC 10.9 4.6 - 13.2 K/uL    RBC 6.71 (H) 4.20 - 5.30 M/uL    Hemoglobin 14.2 12.0 - 16.0 g/dL    Hematocrit 44.4 35.0 - 45.0 %    MCV 66.2 (L) 78.0 - 100.0 FL    MCH 21.2 (L) 24.0 - 34.0 PG    MCHC 32.0 31.0 - 37.0 g/dL    RDW 17.2 (H) 11.6 - 14.5 %    Platelets 587 (H) 135 - 420 K/uL    MPV 9.7 9.2 - 11.8 FL    Nucleated RBCs 0.0 0  WBC    nRBC 0.00 0.00 - 0.01 K/uL    Neutrophils % 69 40 - 73 %    Lymphocytes % 21 21 - 52 %    Monocytes % 9 3 - 10 %    Eosinophils % 1 0 - 5 %    Basophils % 1 0 - 2 %    Immature Granulocytes % 0 0.0 - 0.5 %    Neutrophils Absolute 7.5 1.8 - 8.0 K/UL    Lymphocytes Absolute 2.3 0.9 - 3.6 K/UL    Monocytes Absolute 1.0 0.05 - 1.2 K/UL    Eosinophils Absolute 0.1 0.0 - 0.4 K/UL    Basophils Absolute 0.1 0.0 - 0.1 K/UL    Immature Granulocytes Absolute 0.0 0.00 - 0.04 K/UL    Differential Type AUTOMATED     Comprehensive Metabolic Panel    Collection Time: 07/18/24 10:23 PM   Result Value Ref Range    Sodium 132 (L) 136 - 145 mmol/L    Potassium 3.9 3.5 - 5.5 mmol/L    Chloride 90 (L) 100 - 111 mmol/L    CO2 25 21 - 32 mmol/L    Anion Gap 17 3.0 - 18 mmol/L    Glucose 310 (H) 74 - 99 mg/dL    BUN 36 (H) 7.0 - 18 MG/DL    Creatinine 1.60 (H) 0.6 - 1.3 MG/DL    BUN/Creatinine Ratio 23 (H) 12 - 20      Est, Glom Filt Rate 37 (L) >60 ml/min/1.73m2    Calcium 11.0 (H) 8.5 -  concerns were addressed.       Procedures    FINAL IMPRESSION      Dehydration    Hypkalemia    Dehydration    Post left knee replacement    DISPOSITION/PLAN     DISPOSITION  D/C home.  F/U PCP 2 days.  Return to ER prn.    DISCHARGE MEDICATIONS:    zofran     Potassium    Bentyl      PATIENT REFERRED TO:  PCP in 2 days.  Return to DR prn.      (Please note that portions of this note were completed with a voice recognitionprogram.  Efforts were made to edit the dictations but occasionally words are mis-transcribed.)    Uli Morales MD(electronically signed)  Attending Emergency Physician          Uli Morales MD  07/19/24 1529

## 2024-07-19 NOTE — ED NOTES
Pt allergies verified pt medicated per MAR, cardiac monitor in place, swabs obtained and sent to lab . Family at bedside no questions or concerns a this time

## 2024-07-19 NOTE — ED TRIAGE NOTES
Pt wheeled to EKG room, c/o of vomiting, chest pain, and SOB that started today. Pt states \"I get SOB when I vomit.\" Pt able to respond in full sentences during triage.

## 2024-08-19 ENCOUNTER — OFFICE VISIT (OUTPATIENT)
Age: 59
End: 2024-08-19
Payer: COMMERCIAL

## 2024-08-19 VITALS — HEIGHT: 58 IN | WEIGHT: 190 LBS | BODY MASS INDEX: 39.88 KG/M2

## 2024-08-19 DIAGNOSIS — G56.03 BILATERAL CARPAL TUNNEL SYNDROME: Primary | ICD-10-CM

## 2024-08-19 PROCEDURE — 99204 OFFICE O/P NEW MOD 45 MIN: CPT | Performed by: ORTHOPAEDIC SURGERY

## 2024-08-19 PROCEDURE — 20526 THER INJECTION CARP TUNNEL: CPT | Performed by: ORTHOPAEDIC SURGERY

## 2024-08-19 RX ORDER — LIDOCAINE HYDROCHLORIDE 10 MG/ML
1 INJECTION, SOLUTION INFILTRATION; PERINEURAL ONCE
Status: COMPLETED | OUTPATIENT
Start: 2024-08-19 | End: 2024-08-19

## 2024-08-19 RX ADMIN — LIDOCAINE HYDROCHLORIDE 1 ML: 10 INJECTION, SOLUTION INFILTRATION; PERINEURAL at 15:53

## 2024-08-19 NOTE — PROGRESS NOTES
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  Worker's Compensation and legal considerations: none    Chief Complaint   Patient presents with    Wrist Pain     Bilateral cts     Pain Score:   7    Subjective:     Initial HPI: Patient presents today with a history of bilateral hand numbness and tingling right much worse than left.    Date of onset: Chronic  Injury: No  Prior Treatment:  Yes: Comment: Injections with significant improvement.  Braces helped somewhat.  Contributory history: None    ROS: Review of Systems - General ROS: negative except HPI    Past Medical History:   Diagnosis Date    Diabetes mellitus (HCC)     Hypertension     Left knee pain     Migraine     Morbid obesity (HCC)        Past Surgical History:   Procedure Laterality Date     SECTION      COLONOSCOPY N/A 2019    COLONOSCOPY performed by Porfirio Calvert MD at [facility] ENDOSCOPY        Current Outpatient Medications   Medication Sig Dispense Refill    ondansetron (ZOFRAN) 4 MG tablet Take 1 tablet by mouth 3 times daily as needed for Nausea or Vomiting 15 tablet 0    potassium chloride (KLOR-CON M) 20 MEQ extended release tablet Take 1 tablet by mouth 2 times daily 10 tablet 1    dicyclomine (BENTYL) 20 MG tablet Take 1 tablet by mouth 4 times daily 20 tablet 0    atorvastatin (LIPITOR) 40 MG tablet Take by mouth daily      celecoxib (CELEBREX) 200 MG capsule Take 1 capsule by mouth 2 times daily      glimepiride (AMARYL) 4 MG tablet Take by mouth      lisinopril (PRINIVIL;ZESTRIL) 5 MG tablet Take 1 tablet by mouth daily      metoprolol succinate (TOPROL XL) 100 MG extended release tablet Take 1 tablet by mouth daily      pantoprazole (PROTONIX) 40 MG tablet Take 1 tablet by mouth daily      pioglitazone (ACTOS) 45 MG tablet Take 1 tablet by mouth daily      triamterene-hydroCHLOROthiazide (MAXZIDE) 75-50 MG per tablet Take 0.5 tablets by mouth daily      nitroGLYCERIN (NITROSTAT) 0.4 MG SL tablet Place 0.4

## 2024-12-19 ENCOUNTER — PROCEDURE VISIT (OUTPATIENT)
Age: 59
End: 2024-12-19
Payer: OTHER GOVERNMENT

## 2024-12-19 VITALS
HEART RATE: 82 BPM | BODY MASS INDEX: 40.77 KG/M2 | DIASTOLIC BLOOD PRESSURE: 70 MMHG | TEMPERATURE: 97.3 F | SYSTOLIC BLOOD PRESSURE: 109 MMHG | RESPIRATION RATE: 18 BRPM | WEIGHT: 194.2 LBS | HEIGHT: 58 IN

## 2024-12-19 DIAGNOSIS — R94.131 ABNORMAL EMG: ICD-10-CM

## 2024-12-19 DIAGNOSIS — R20.2 NUMBNESS AND TINGLING IN BOTH HANDS: Primary | ICD-10-CM

## 2024-12-19 DIAGNOSIS — G56.03 BILATERAL CARPAL TUNNEL SYNDROME: ICD-10-CM

## 2024-12-19 DIAGNOSIS — R20.0 NUMBNESS AND TINGLING IN BOTH HANDS: Primary | ICD-10-CM

## 2024-12-19 PROCEDURE — 95886 MUSC TEST DONE W/N TEST COMP: CPT | Performed by: PHYSICAL MEDICINE & REHABILITATION

## 2024-12-19 PROCEDURE — 95911 NRV CNDJ TEST 9-10 STUDIES: CPT | Performed by: PHYSICAL MEDICINE & REHABILITATION

## 2024-12-19 NOTE — PROGRESS NOTES
Wrist-Dig V 2.3  < 3.1 3.1  < 4.0 12  > 7 Wrist-Dig V 14 61 -      EMG+     Side Muscle Nerve Root Ins Act Fibs Psw Fascics Other Amp Dur Poly Recrt Activation Comment Misc   Right Biceps Musculocut C5-C6 Nml Nml Nml Nml 0 Nml Nml 0 Nml Nml     Right Triceps Radial C6-C8 Nml Nml Nml Nml 0 Nml Nml 0 Nml Nml     Right Pronator Teres Median C6-C7 Nml Nml Nml Nml 0 Nml Nml 0 Nml Nml     Right FDI Median,  Ulnar C8-T1 Nml Nml Nml Nml 0 Nml Nml 0 Nml Nml     Right APB Median C8-T1 Incr 2+ 3+ Nml 0 Nml Nml 0 Sl.Red Nml     Left Biceps Musculocut C5-C6 Nml Nml Nml Nml 0 Nml Nml 0 Nml Nml     Left Triceps Radial C6-C8 Nml Nml Nml Nml 0 Nml Nml 0 Nml Nml     Left Pronator Teres Median C6-C7 Nml Nml Nml Nml 0 Nml Nml 0 Nml Nml     Left FDI Median,  Ulnar C8-T1 Nml Nml Nml Nml 0 Nml Nml 0 Nml Nml     Left APB Median C8-T1 Incr 2+ 3+ Nml 0 Nml Nml 0 Nml Nml             Waveforms:    Motor                Sensory                      [facility]  OFFICE PROCEDURE PROGRESS NOTE        Chart reviewed for the following:   Azam LEONARDO, have reviewed the History, Physical and updated the Allergic reactions for Susan Schrader     TIME OUT performed immediately prior to start of procedure:   Azam LEOANRDO, have performed the following reviews on Susan Schrader prior to the start of the procedure:            * Patient was identified by name and date of birth   * Agreement on procedure being performed was verified  * Risks and Benefits explained to the patient  * Procedure site verified and marked as necessary  * Patient was positioned for comfort  * Consent was signed and verified     Time: 3:09 PM EST     Date of procedure: 12/19/24     Procedure performed by:  Azam Lam MD    Provider accompanied by: Yanetibpriscilla.    Patient accompanied by: Self    How tolerated by patient: tolerated the procedure well with no complications    Post Procedural Pain Scale: 0 - No

## 2025-01-02 ENCOUNTER — OFFICE VISIT (OUTPATIENT)
Age: 60
End: 2025-01-02

## 2025-01-02 VITALS — HEIGHT: 58 IN | WEIGHT: 194 LBS | BODY MASS INDEX: 40.72 KG/M2

## 2025-01-02 DIAGNOSIS — Z01.818 PREOP EXAMINATION: Primary | ICD-10-CM

## 2025-01-02 DIAGNOSIS — G56.03 BILATERAL CARPAL TUNNEL SYNDROME: ICD-10-CM

## 2025-01-02 DIAGNOSIS — G56.01 SEVERE CARPAL TUNNEL SYNDROME, RIGHT: Primary | ICD-10-CM

## 2025-01-02 DIAGNOSIS — G56.02 SEVERE CARPAL TUNNEL SYNDROME, LEFT: ICD-10-CM

## 2025-01-02 NOTE — PROGRESS NOTES
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  She reports previous injection minimally helped the left side but did not help the right side.    Initial HPI: Patient presents today with a history of bilateral hand numbness and tingling right much worse than left.    Date of onset: Chronic  Injury: No  Prior Treatment:  Yes: Comment: Injections with significant improvement.  Braces helped somewhat.  Contributory history: None    ROS: Review of Systems - General ROS: negative except HPI    Past Medical History:   Diagnosis Date    Diabetes mellitus (HCC)     Hypertension     Left knee pain     Migraine     Morbid obesity        Past Surgical History:   Procedure Laterality Date     SECTION      COLONOSCOPY N/A 2019    COLONOSCOPY performed by Porfirio Calvert MD at [facility] ENDOSCOPY        Current Outpatient Medications   Medication Sig Dispense Refill    ondansetron (ZOFRAN) 4 MG tablet Take 1 tablet by mouth 3 times daily as needed for Nausea or Vomiting 15 tablet 0    potassium chloride (KLOR-CON M) 20 MEQ extended release tablet Take 1 tablet by mouth 2 times daily 10 tablet 1    dicyclomine (BENTYL) 20 MG tablet Take 1 tablet by mouth 4 times daily 20 tablet 0    atorvastatin (LIPITOR) 40 MG tablet Take by mouth daily      celecoxib (CELEBREX) 200 MG capsule Take 1 capsule by mouth 2 times daily      glimepiride (AMARYL) 4 MG tablet Take by mouth      lisinopril (PRINIVIL;ZESTRIL) 5 MG tablet Take 1 tablet by mouth daily      metoprolol succinate (TOPROL XL) 100 MG extended release tablet Take 1 tablet by mouth daily      nitroGLYCERIN (NITROSTAT) 0.4 MG SL tablet Place 0.4 mg under the tongue (Patient not taking: Reported on 2024)      pantoprazole

## 2025-03-10 ENCOUNTER — CLINICAL DOCUMENTATION (OUTPATIENT)
Age: 60
End: 2025-03-10

## 2025-03-10 NOTE — PROGRESS NOTES
Patient called to reschedule surgery that was scheduled for 4/17 to 6/12.  Patient's pre op and post op appts changed and patient moved on surgery calendar.   Patient instruction letter updated and mailed to patient.

## 2025-05-23 ENCOUNTER — HOSPITAL ENCOUNTER (OUTPATIENT)
Age: 60
Discharge: HOME | End: 2025-05-23
Payer: OTHER GOVERNMENT

## 2025-05-23 DIAGNOSIS — G56.03 BILATERAL CARPAL TUNNEL SYNDROME: ICD-10-CM

## 2025-05-23 DIAGNOSIS — Z01.818 PREOP EXAMINATION: ICD-10-CM

## 2025-05-23 LAB
ALBUMIN SERPL-MCNC: 3.7 G/DL (ref 3.4–5)
ALBUMIN/GLOB SERPL: 1.1 (ref 0.8–1.7)
ALP SERPL-CCNC: 106 U/L (ref 45–117)
ALT SERPL-CCNC: 19 U/L (ref 10–35)
ANION GAP SERPL CALC-SCNC: 14 MMOL/L (ref 3–18)
AST SERPL-CCNC: 18 U/L (ref 10–38)
BASOPHILS # BLD: 0.07 K/UL (ref 0–0.1)
BASOPHILS NFR BLD: 0.6 % (ref 0–2)
BILIRUB SERPL-MCNC: 0.6 MG/DL (ref 0.2–1)
BUN SERPL-MCNC: 21 MG/DL (ref 6–23)
BUN/CREAT SERPL: 25 (ref 12–20)
CALCIUM SERPL-MCNC: 10.2 MG/DL (ref 8.5–10.1)
CHLORIDE SERPL-SCNC: 100 MMOL/L (ref 98–107)
CO2 SERPL-SCNC: 26 MMOL/L (ref 21–32)
CREAT SERPL-MCNC: 0.86 MG/DL (ref 0.6–1.3)
DIFFERENTIAL METHOD BLD: ABNORMAL
EOSINOPHIL # BLD: 0.19 K/UL (ref 0–0.4)
EOSINOPHIL NFR BLD: 1.7 % (ref 0–5)
ERYTHROCYTE [DISTWIDTH] IN BLOOD BY AUTOMATED COUNT: 16.1 % (ref 11.6–14.5)
EST. AVERAGE GLUCOSE BLD GHB EST-MCNC: 221 MG/DL
GLOBULIN SER CALC-MCNC: 3.4 G/DL (ref 2–4)
GLUCOSE SERPL-MCNC: 228 MG/DL (ref 74–108)
HBA1C MFR BLD: 9.3 % (ref 4.2–5.6)
HCT VFR BLD AUTO: 44.7 % (ref 35–45)
HGB BLD-MCNC: 13.2 G/DL (ref 12–16)
IMM GRANULOCYTES # BLD AUTO: 0.04 K/UL (ref 0–0.04)
IMM GRANULOCYTES NFR BLD AUTO: 0.4 % (ref 0–0.5)
LYMPHOCYTES # BLD: 2.29 K/UL (ref 0.9–3.6)
LYMPHOCYTES NFR BLD: 20.7 % (ref 21–52)
MCH RBC QN AUTO: 20.9 PG (ref 24–34)
MCHC RBC AUTO-ENTMCNC: 29.5 G/DL (ref 31–37)
MCV RBC AUTO: 70.8 FL (ref 78–100)
MONOCYTES # BLD: 0.95 K/UL (ref 0.05–1.2)
MONOCYTES NFR BLD: 8.6 % (ref 3–10)
NEUTS SEG # BLD: 7.52 K/UL (ref 1.8–8)
NEUTS SEG NFR BLD: 68 % (ref 40–73)
NRBC # BLD: 0 K/UL (ref 0–0.01)
NRBC BLD-RTO: 0 PER 100 WBC
PLATELET # BLD AUTO: 336 K/UL (ref 135–420)
PMV BLD AUTO: 10.9 FL (ref 9.2–11.8)
POTASSIUM SERPL-SCNC: 4.1 MMOL/L (ref 3.5–5.5)
PROT SERPL-MCNC: 7.1 G/DL (ref 6.4–8.2)
RBC # BLD AUTO: 6.31 M/UL (ref 4.2–5.3)
SODIUM SERPL-SCNC: 140 MMOL/L (ref 136–145)
WBC # BLD AUTO: 11.1 K/UL (ref 4.6–13.2)

## 2025-05-23 PROCEDURE — 80053 COMPREHEN METABOLIC PANEL: CPT

## 2025-05-23 PROCEDURE — 36415 COLL VENOUS BLD VENIPUNCTURE: CPT

## 2025-05-23 PROCEDURE — 83036 HEMOGLOBIN GLYCOSYLATED A1C: CPT

## 2025-05-23 PROCEDURE — 85025 COMPLETE CBC W/AUTO DIFF WBC: CPT

## 2025-05-29 ENCOUNTER — OFFICE VISIT (OUTPATIENT)
Age: 60
End: 2025-05-29

## 2025-05-29 VITALS — BODY MASS INDEX: 40.67 KG/M2 | TEMPERATURE: 97 F | WEIGHT: 194.6 LBS

## 2025-05-29 DIAGNOSIS — G56.01 SEVERE CARPAL TUNNEL SYNDROME, RIGHT: Primary | ICD-10-CM

## 2025-05-29 RX ORDER — LIDOCAINE HYDROCHLORIDE 10 MG/ML
0.5 INJECTION, SOLUTION INFILTRATION; PERINEURAL ONCE
Status: COMPLETED | OUTPATIENT
Start: 2025-05-29 | End: 2025-05-29

## 2025-05-29 RX ADMIN — LIDOCAINE HYDROCHLORIDE 0.5 ML: 10 INJECTION, SOLUTION INFILTRATION; PERINEURAL at 16:11

## 2025-05-29 NOTE — PROGRESS NOTES
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  She reports having issues getting her diabetes medication.    2025 HPI: Patient presents today for bilateral EMG follow-up.  She reports previous injection minimally helped the left side but did not help the right side.    Initial HPI: Patient presents today with a history of bilateral hand numbness and tingling right much worse than left.    Date of onset: Chronic  Injury: No  Prior Treatment:  Yes: Comment: Injections with significant improvement.  Braces helped somewhat.  Contributory history: None    ROS: Review of Systems - General ROS: negative except HPI    Past Medical History:   Diagnosis Date    Diabetes mellitus (HCC)     Hypertension     Left knee pain     Migraine     Morbid obesity (HCC)        Past Surgical History:   Procedure Laterality Date     SECTION      COLONOSCOPY N/A 2019    COLONOSCOPY performed by Porfirio Calvert MD at [facility] ENDOSCOPY        Current Outpatient Medications   Medication Sig Dispense Refill    ondansetron (ZOFRAN) 4 MG tablet Take 1 tablet by mouth 3 times daily as needed for Nausea or Vomiting 15 tablet 0    potassium chloride (KLOR-CON M) 20 MEQ extended release tablet Take 1 tablet by mouth 2 times daily 10 tablet 1    dicyclomine (BENTYL) 20 MG tablet Take 1 tablet by mouth 4 times daily 20 tablet 0    atorvastatin (LIPITOR) 40 MG tablet Take by mouth daily      celecoxib (CELEBREX) 200 MG capsule Take 1 capsule by mouth 2 times daily      glimepiride (AMARYL) 4 MG tablet Take by mouth      lisinopril (PRINIVIL;ZESTRIL) 5 MG tablet Take 1 tablet by mouth daily      metoprolol

## 2025-07-24 ENCOUNTER — OFFICE VISIT (OUTPATIENT)
Age: 60
End: 2025-07-24
Payer: COMMERCIAL

## 2025-07-24 VITALS
HEART RATE: 74 BPM | WEIGHT: 194 LBS | OXYGEN SATURATION: 99 % | DIASTOLIC BLOOD PRESSURE: 76 MMHG | BODY MASS INDEX: 40.72 KG/M2 | HEIGHT: 58 IN | SYSTOLIC BLOOD PRESSURE: 110 MMHG

## 2025-07-24 DIAGNOSIS — I10 HYPERTENSION, UNSPECIFIED TYPE: ICD-10-CM

## 2025-07-24 DIAGNOSIS — R00.0 TACHYCARDIA: Primary | ICD-10-CM

## 2025-07-24 PROCEDURE — 3074F SYST BP LT 130 MM HG: CPT | Performed by: INTERNAL MEDICINE

## 2025-07-24 PROCEDURE — 93000 ELECTROCARDIOGRAM COMPLETE: CPT | Performed by: INTERNAL MEDICINE

## 2025-07-24 PROCEDURE — 99204 OFFICE O/P NEW MOD 45 MIN: CPT | Performed by: INTERNAL MEDICINE

## 2025-07-24 PROCEDURE — 3078F DIAST BP <80 MM HG: CPT | Performed by: INTERNAL MEDICINE

## 2025-07-24 RX ORDER — ORAL SEMAGLUTIDE 7 MG/1
1 TABLET ORAL DAILY
COMMUNITY

## 2025-07-24 RX ORDER — ERGOCALCIFEROL 1.25 MG/1
50000 CAPSULE, LIQUID FILLED ORAL WEEKLY
COMMUNITY

## 2025-07-24 RX ORDER — EMPAGLIFLOZIN 25 MG/1
25 TABLET, FILM COATED ORAL EVERY MORNING
COMMUNITY

## 2025-07-24 NOTE — PROGRESS NOTES
Susan Schrader presents today for   Chief Complaint   Patient presents with    New Patient     Est care referred by pcp due to tachycardia        palpitations    Coordination of Care:  1. Have you been to the ER, urgent care clinic since your last visit? Hospitalized since your last visit? no    Allergies   Allergen Reactions    Dulaglutide Other (See Comments)       Prior to Admission medications    Medication Sig Start Date End Date Taking? Authorizing Provider   vitamin D (ERGOCALCIFEROL) 1.25 MG (69409 UT) CAPS capsule Take 1 capsule by mouth once a week   Yes ProviderAdeline MD   RYBELSUS 7 MG TABS Take 1 tablet by mouth daily   Yes Provider, MD Adeline   JARDIANCE 25 MG tablet Take 1 tablet by mouth every morning   Yes ProviderAdeline MD   atorvastatin (LIPITOR) 40 MG tablet Take 1 tablet by mouth nightly   Yes Automatic Reconciliation, Ar   glimepiride (AMARYL) 4 MG tablet Take 1 tablet by mouth every morning (before breakfast)   Yes Automatic Reconciliation, Ar   lisinopril (PRINIVIL;ZESTRIL) 10 MG tablet Take 1 tablet by mouth daily   Yes Automatic Reconciliation, Ar   metoprolol succinate (TOPROL XL) 100 MG extended release tablet Take 1 tablet by mouth daily   Yes Automatic Reconciliation, Ar   nitroGLYCERIN (NITROSTAT) 0.4 MG SL tablet Place 1 tablet under the tongue every 5 minutes as needed for Chest pain 10/19/13  Yes Automatic Reconciliation, Ar   pantoprazole (PROTONIX) 40 MG tablet Take 1 tablet by mouth daily   Yes Automatic Reconciliation, Ar   triamterene-hydroCHLOROthiazide (MAXZIDE) 75-50 MG per tablet Take 0.5 tablets by mouth daily   Yes Automatic Reconciliation, Ar   ondansetron (ZOFRAN) 4 MG tablet Take 1 tablet by mouth 3 times daily as needed for Nausea or Vomiting  Patient not taking: Reported on 7/24/2025 7/19/24   Uli Morales MD   dicyclomine (BENTYL) 20 MG tablet Take 1 tablet by mouth 4 times daily  Patient not taking: Reported on 7/24/2025 7/19/24

## 2025-07-24 NOTE — PROGRESS NOTES
Susan Schrader    Chief Complaint   Patient presents with    New Patient     Est care referred by pcp due to tachycardia        HPI    Susan Schrader is a 60 y.o. female with DM2, HTN, HL here for cardiac evaluation of CP.    As you know, almost daily, sometimes a couple times a day- feels pressure/ pain left side of chest that goes up into her neck. This happened in waiting room coming back today. She had a stress test with Dr. Cohn ~ (and even a heart cath).    Past Medical History:   Diagnosis Date    Diabetes mellitus (HCC)     Hypertension     Left knee pain     Migraine     Morbid obesity (HCC)     Tachycardia        Past Surgical History:   Procedure Laterality Date     SECTION      COLONOSCOPY N/A 2019    COLONOSCOPY performed by Porfirio Calvert MD at [facility] ENDOSCOPY       Current Outpatient Medications   Medication Sig Dispense Refill    vitamin D (ERGOCALCIFEROL) 1.25 MG (55316 UT) CAPS capsule Take 1 capsule by mouth once a week      RYBELSUS 7 MG TABS Take 1 tablet by mouth daily      JARDIANCE 25 MG tablet Take 1 tablet by mouth every morning      atorvastatin (LIPITOR) 40 MG tablet Take 1 tablet by mouth nightly      glimepiride (AMARYL) 4 MG tablet Take 1 tablet by mouth every morning (before breakfast)      lisinopril (PRINIVIL;ZESTRIL) 10 MG tablet Take 1 tablet by mouth daily      metoprolol succinate (TOPROL XL) 100 MG extended release tablet Take 1 tablet by mouth daily      nitroGLYCERIN (NITROSTAT) 0.4 MG SL tablet Place 1 tablet under the tongue every 5 minutes as needed for Chest pain      pantoprazole (PROTONIX) 40 MG tablet Take 1 tablet by mouth daily      triamterene-hydroCHLOROthiazide (MAXZIDE) 75-50 MG per tablet Take 0.5 tablets by mouth daily      ondansetron (ZOFRAN) 4 MG tablet Take 1 tablet by mouth 3 times daily as needed for Nausea or Vomiting (Patient not taking: Reported on 2025) 15 tablet 0    dicyclomine (BENTYL) 20 MG tablet Take 1

## 2025-08-27 ENCOUNTER — HOSPITAL ENCOUNTER (OUTPATIENT)
Facility: HOSPITAL | Age: 60
Discharge: HOME | End: 2025-08-29
Attending: INTERNAL MEDICINE
Payer: COMMERCIAL

## 2025-08-27 VITALS
HEART RATE: 85 BPM | WEIGHT: 196 LBS | BODY MASS INDEX: 39.51 KG/M2 | SYSTOLIC BLOOD PRESSURE: 124 MMHG | DIASTOLIC BLOOD PRESSURE: 86 MMHG | HEIGHT: 59 IN

## 2025-08-27 DIAGNOSIS — R00.0 TACHYCARDIA: ICD-10-CM

## 2025-08-27 DIAGNOSIS — I10 HYPERTENSION, UNSPECIFIED TYPE: ICD-10-CM

## 2025-08-27 LAB
ECHO BSA: 1.92 M2
ECHO LV EF PHYSICIAN: 60 %
STRESS ANGINA INDEX: 0
STRESS BASELINE DIAS BP: 86 MMHG
STRESS BASELINE HR: 85 BPM
STRESS BASELINE SYS BP: 124 MMHG
STRESS ESTIMATED WORKLOAD: 7 METS
STRESS EXERCISE DUR MIN: 5 MIN
STRESS EXERCISE DUR SEC: 2 SEC
STRESS PEAK DIAS BP: 88 MMHG
STRESS PEAK SYS BP: 140 MMHG
STRESS PERCENT HR ACHIEVED: 104 %
STRESS POST PEAK HR: 166 BPM
STRESS RATE PRESSURE PRODUCT: NORMAL BPM*MMHG
STRESS SR DUKE TREADMILL SCORE: -2
STRESS ST DEPRESSION: 1.5 MM
STRESS TARGET HR: 160 BPM

## 2025-08-27 PROCEDURE — 93350 STRESS TTE ONLY: CPT

## 2025-08-28 ENCOUNTER — RESULTS FOLLOW-UP (OUTPATIENT)
Age: 60
End: 2025-08-28

## 2025-08-28 DIAGNOSIS — R07.9 CHEST PAIN, UNSPECIFIED TYPE: Primary | ICD-10-CM

## 2025-08-28 DIAGNOSIS — R06.02 SOB (SHORTNESS OF BREATH): ICD-10-CM

## (undated) DEVICE — INFLOW CASSETTE TUBING, DO NOT USE IF PACKAGE IS DAMAGED: Brand: CROSSFLOW

## (undated) DEVICE — AIRLIFE™ NASAL OXYGEN CANNULA CURVED, NONFLARED TIP WITH 14 FOOT (4.3 M) CRUSH-RESISTANT TUBING, OVER-THE-EAR STYLE: Brand: AIRLIFE™

## (undated) DEVICE — SOLUTION IRRIG 1000ML H2O STRL BLT

## (undated) DEVICE — [TOMCAT CUTTER, ARTHROSCOPIC SHAVER BLADE,  DO NOT RESTERILIZE,  DO NOT USE IF PACKAGE IS DAMAGED,  KEEP DRY,  KEEP AWAY FROM SUNLIGHT]: Brand: FORMULA

## (undated) DEVICE — APPLICATOR SCRB 26ML TEAL STRL -- CHLORAPREP 26ML

## (undated) DEVICE — SYRINGE MED 25GA 3ML L5/8IN SUBQ PLAS W/ DETACH NDL SFTY

## (undated) DEVICE — ICE BG EGL STYL 9 IN BLU 12 CS

## (undated) DEVICE — SYR 50ML SLIP TIP NSAF LF STRL --

## (undated) DEVICE — CANNULA ORIG TL CLR W FOAM CUSHIONS AND 14FT SUPL TB 3 CHN

## (undated) DEVICE — MEDI-VAC SUCTION HIGH CAPACITY: Brand: CARDINAL HEALTH

## (undated) DEVICE — KIT CLN UP BON SECOURS MARYV

## (undated) DEVICE — SYRINGE MED 20ML STD CLR PLAS LUERLOCK TIP N CTRL DISP

## (undated) DEVICE — CATHETER SUCT TR FL TIP 14FR W/ O CTRL

## (undated) DEVICE — ENDOSCOPY PUMP TUBING/ CAP SET: Brand: ERBE

## (undated) DEVICE — (D)GLOVE EXAM LG NITRL NS -- DISC BY MFR NO SUB

## (undated) DEVICE — Device

## (undated) DEVICE — SOLUTION IRRIG 3000ML 0.9% SOD CHL FLX CONT 0797208] ICU MEDICAL INC]

## (undated) DEVICE — GLOVE ORTHO 8   MSG9480

## (undated) DEVICE — NDL BX PT FLNG DSGN 15GX4.7CM --

## (undated) DEVICE — BLANKET WRM AD W50XL85.8IN PACU FULL BODY FORC AIR

## (undated) DEVICE — KNEE ARTHROSCOPY III-LF: Brand: MEDLINE INDUSTRIES, INC.

## (undated) DEVICE — INTENDED FOR TISSUE SEPARATION, AND OTHER PROCEDURES THAT REQUIRE A SHARP SURGICAL BLADE TO PUNCTURE OR CUT.: Brand: BARD-PARKER ®  SAFETY SCALPED

## (undated) DEVICE — FLEX ADVANTAGE 3000CC: Brand: FLEX ADVANTAGE

## (undated) DEVICE — DISPOSABLE MULTI BAG ADAPTERS Y                                    TUBING, STERILE, 2 TO A SET 6 SETS                                    PER BOX

## (undated) DEVICE — MEDI-VAC NON-CONDUCTIVE SUCTION TUBING: Brand: CARDINAL HEALTH

## (undated) DEVICE — DRAPE,EXTREMITY,89X128,STERILE: Brand: MEDLINE

## (undated) DEVICE — GAUZE SPONGES,16 PLY: Brand: CURITY

## (undated) DEVICE — GOWN ISOL IMPERV UNIV, DISP, OPEN BACK, BLUE --

## (undated) DEVICE — GARMENT,MEDLINE,DVT,INT,CALF,LG, GEN2: Brand: MEDLINE

## (undated) DEVICE — PENCIL ES L3M BTTN SWCH S STL HEX LOK BLDE ELECTRD HOLSTER

## (undated) DEVICE — FLUFF AND POLYMER UNDERPAD,EXTRA HEAVY: Brand: WINGS

## (undated) DEVICE — SYR 10ML LUER LOK 1/5ML GRAD --